# Patient Record
Sex: FEMALE | Race: WHITE | NOT HISPANIC OR LATINO | Employment: OTHER | URBAN - METROPOLITAN AREA
[De-identification: names, ages, dates, MRNs, and addresses within clinical notes are randomized per-mention and may not be internally consistent; named-entity substitution may affect disease eponyms.]

---

## 2023-09-07 LAB
ALANINE AMINOTRANSFERASE (SGPT) (U/L) IN SER/PLAS: 23 U/L (ref 7–45)
ALBUMIN (G/DL) IN SER/PLAS: 4.5 G/DL (ref 3.4–5)
ALKALINE PHOSPHATASE (U/L) IN SER/PLAS: 67 U/L (ref 33–136)
ANION GAP IN SER/PLAS: 17 MMOL/L (ref 10–20)
ASPARTATE AMINOTRANSFERASE (SGOT) (U/L) IN SER/PLAS: 24 U/L (ref 9–39)
BASOPHILS (10*3/UL) IN BLOOD BY AUTOMATED COUNT: 0.08 X10E9/L (ref 0–0.1)
BASOPHILS/100 LEUKOCYTES IN BLOOD BY AUTOMATED COUNT: 1 % (ref 0–2)
BILIRUBIN TOTAL (MG/DL) IN SER/PLAS: 0.7 MG/DL (ref 0–1.2)
CALCIDIOL (25 OH VITAMIN D3) (NG/ML) IN SER/PLAS: 33 NG/ML
CALCIUM (MG/DL) IN SER/PLAS: 10.5 MG/DL (ref 8.6–10.6)
CARBON DIOXIDE, TOTAL (MMOL/L) IN SER/PLAS: 27 MMOL/L (ref 21–32)
CHLORIDE (MMOL/L) IN SER/PLAS: 99 MMOL/L (ref 98–107)
CHOLESTEROL (MG/DL) IN SER/PLAS: 199 MG/DL (ref 0–199)
CHOLESTEROL IN HDL (MG/DL) IN SER/PLAS: 49 MG/DL
CHOLESTEROL/HDL RATIO: 4.1
CREATININE (MG/DL) IN SER/PLAS: 0.94 MG/DL (ref 0.5–1.05)
EOSINOPHILS (10*3/UL) IN BLOOD BY AUTOMATED COUNT: 0.29 X10E9/L (ref 0–0.4)
EOSINOPHILS/100 LEUKOCYTES IN BLOOD BY AUTOMATED COUNT: 3.5 % (ref 0–6)
ERYTHROCYTE DISTRIBUTION WIDTH (RATIO) BY AUTOMATED COUNT: 13.8 % (ref 11.5–14.5)
ERYTHROCYTE MEAN CORPUSCULAR HEMOGLOBIN CONCENTRATION (G/DL) BY AUTOMATED: 31.6 G/DL (ref 32–36)
ERYTHROCYTE MEAN CORPUSCULAR VOLUME (FL) BY AUTOMATED COUNT: 95 FL (ref 80–100)
ERYTHROCYTES (10*6/UL) IN BLOOD BY AUTOMATED COUNT: 4.88 X10E12/L (ref 4–5.2)
ESTIMATED AVERAGE GLUCOSE FOR HBA1C: 117 MG/DL
GFR FEMALE: 64 ML/MIN/1.73M2
GLUCOSE (MG/DL) IN SER/PLAS: 103 MG/DL (ref 74–99)
HEMATOCRIT (%) IN BLOOD BY AUTOMATED COUNT: 46.2 % (ref 36–46)
HEMOGLOBIN (G/DL) IN BLOOD: 14.6 G/DL (ref 12–16)
HEMOGLOBIN A1C/HEMOGLOBIN TOTAL IN BLOOD: 5.7 %
IMMATURE GRANULOCYTES/100 LEUKOCYTES IN BLOOD BY AUTOMATED COUNT: 0.2 % (ref 0–0.9)
LDL: 120 MG/DL (ref 0–99)
LEUKOCYTES (10*3/UL) IN BLOOD BY AUTOMATED COUNT: 8.3 X10E9/L (ref 4.4–11.3)
LYMPHOCYTES (10*3/UL) IN BLOOD BY AUTOMATED COUNT: 2.01 X10E9/L (ref 0.8–3)
LYMPHOCYTES/100 LEUKOCYTES IN BLOOD BY AUTOMATED COUNT: 24.2 % (ref 13–44)
MONOCYTES (10*3/UL) IN BLOOD BY AUTOMATED COUNT: 0.55 X10E9/L (ref 0.05–0.8)
MONOCYTES/100 LEUKOCYTES IN BLOOD BY AUTOMATED COUNT: 6.6 % (ref 2–10)
NEUTROPHILS (10*3/UL) IN BLOOD BY AUTOMATED COUNT: 5.37 X10E9/L (ref 1.6–5.5)
NEUTROPHILS/100 LEUKOCYTES IN BLOOD BY AUTOMATED COUNT: 64.5 % (ref 40–80)
NRBC (PER 100 WBCS) BY AUTOMATED COUNT: 0 /100 WBC (ref 0–0)
PLATELETS (10*3/UL) IN BLOOD AUTOMATED COUNT: 300 X10E9/L (ref 150–450)
POTASSIUM (MMOL/L) IN SER/PLAS: 4 MMOL/L (ref 3.5–5.3)
PROTEIN TOTAL: 7.9 G/DL (ref 6.4–8.2)
SODIUM (MMOL/L) IN SER/PLAS: 139 MMOL/L (ref 136–145)
THYROTROPIN (MIU/L) IN SER/PLAS BY DETECTION LIMIT <= 0.05 MIU/L: 2.5 MIU/L (ref 0.44–3.98)
TRIGLYCERIDE (MG/DL) IN SER/PLAS: 151 MG/DL (ref 0–149)
UREA NITROGEN (MG/DL) IN SER/PLAS: 17 MG/DL (ref 6–23)
VLDL: 30 MG/DL (ref 0–40)

## 2023-10-04 DIAGNOSIS — E78.2 MIXED HYPERLIPIDEMIA: Primary | ICD-10-CM

## 2023-10-04 RX ORDER — PRAVASTATIN SODIUM 20 MG/1
20 TABLET ORAL NIGHTLY
Qty: 90 TABLET | Refills: 3 | Status: SHIPPED | OUTPATIENT
Start: 2023-10-04 | End: 2023-10-05 | Stop reason: SDUPTHER

## 2023-10-04 RX ORDER — PRAVASTATIN SODIUM 20 MG/1
20 TABLET ORAL NIGHTLY
COMMUNITY
End: 2023-10-04 | Stop reason: SDUPTHER

## 2023-10-05 DIAGNOSIS — E78.2 MIXED HYPERLIPIDEMIA: ICD-10-CM

## 2023-10-06 RX ORDER — PRAVASTATIN SODIUM 20 MG/1
20 TABLET ORAL NIGHTLY
Qty: 90 TABLET | Refills: 3 | OUTPATIENT
Start: 2023-10-06

## 2023-10-06 RX ORDER — PRAVASTATIN SODIUM 20 MG/1
20 TABLET ORAL NIGHTLY
Qty: 90 TABLET | Refills: 3 | Status: SHIPPED | OUTPATIENT
Start: 2023-10-06

## 2023-10-24 ENCOUNTER — HOSPITAL ENCOUNTER (OUTPATIENT)
Dept: CARDIOLOGY | Facility: CLINIC | Age: 73
Discharge: HOME | End: 2023-10-24
Payer: MEDICARE

## 2023-10-24 DIAGNOSIS — I49.5 SICK SINUS SYNDROME (MULTI): ICD-10-CM

## 2023-10-24 DIAGNOSIS — E03.9 HYPOTHYROIDISM, UNSPECIFIED TYPE: Primary | ICD-10-CM

## 2023-10-24 PROCEDURE — 93296 REM INTERROG EVL PM/IDS: CPT

## 2023-10-24 PROCEDURE — 93294 REM INTERROG EVL PM/LDLS PM: CPT | Performed by: INTERNAL MEDICINE

## 2023-10-24 RX ORDER — LEVOTHYROXINE SODIUM 112 UG/1
112 TABLET ORAL DAILY
Qty: 90 TABLET | Refills: 3 | Status: SHIPPED | OUTPATIENT
Start: 2023-10-24

## 2023-10-25 DIAGNOSIS — I49.5 SICK SINUS SYNDROME (MULTI): ICD-10-CM

## 2023-11-02 ENCOUNTER — HOSPITAL ENCOUNTER (OUTPATIENT)
Dept: CARDIOLOGY | Facility: CLINIC | Age: 73
Discharge: HOME | End: 2023-11-02
Payer: MEDICARE

## 2023-11-02 DIAGNOSIS — I49.5 SICK SINUS SYNDROME (MULTI): ICD-10-CM

## 2023-11-02 DIAGNOSIS — Z95.0 CARDIAC PACEMAKER IN SITU: ICD-10-CM

## 2023-11-17 DIAGNOSIS — I48.91 ATRIAL FIBRILLATION, UNSPECIFIED TYPE (MULTI): ICD-10-CM

## 2023-11-17 DIAGNOSIS — I10 HYPERTENSION, UNSPECIFIED TYPE: ICD-10-CM

## 2023-11-17 RX ORDER — ATENOLOL 25 MG/1
25 TABLET ORAL DAILY
COMMUNITY
Start: 2023-10-16 | End: 2023-11-17 | Stop reason: SDUPTHER

## 2023-11-17 RX ORDER — APIXABAN 5 MG/1
5 TABLET, FILM COATED ORAL 2 TIMES DAILY
Qty: 30 TABLET | Refills: 11 | Status: SHIPPED | OUTPATIENT
Start: 2023-11-17 | End: 2024-04-05 | Stop reason: SDUPTHER

## 2023-11-17 RX ORDER — APIXABAN 5 MG/1
5 TABLET, FILM COATED ORAL 2 TIMES DAILY
COMMUNITY
End: 2023-11-17 | Stop reason: SDUPTHER

## 2023-11-17 RX ORDER — ATENOLOL 25 MG/1
25 TABLET ORAL DAILY
Qty: 90 TABLET | Refills: 3 | Status: SHIPPED | OUTPATIENT
Start: 2023-11-17

## 2023-11-27 ENCOUNTER — HOSPITAL ENCOUNTER (OUTPATIENT)
Dept: CARDIOLOGY | Facility: CLINIC | Age: 73
Discharge: HOME | End: 2023-11-27
Payer: MEDICARE

## 2023-11-27 DIAGNOSIS — I49.5 SICK SINUS SYNDROME (MULTI): ICD-10-CM

## 2023-11-27 DIAGNOSIS — Z95.0 CARDIAC PACEMAKER IN SITU: ICD-10-CM

## 2023-11-30 ENCOUNTER — HOSPITAL ENCOUNTER (OUTPATIENT)
Dept: CARDIOLOGY | Facility: CLINIC | Age: 73
Discharge: HOME | End: 2023-11-30
Payer: MEDICARE

## 2023-11-30 DIAGNOSIS — Z95.0 CARDIAC PACEMAKER IN SITU: ICD-10-CM

## 2023-11-30 DIAGNOSIS — I49.5 SICK SINUS SYNDROME (MULTI): ICD-10-CM

## 2023-12-01 ENCOUNTER — TELEPHONE (OUTPATIENT)
Dept: CARDIOLOGY | Facility: CLINIC | Age: 73
End: 2023-12-01
Payer: MEDICARE

## 2023-12-01 NOTE — TELEPHONE ENCOUNTER
Patient received a text that she was to contact the office for a device check.  Please call her to advise.

## 2023-12-04 DIAGNOSIS — Z95.0 CARDIAC PACEMAKER IN SITU: ICD-10-CM

## 2023-12-04 DIAGNOSIS — I49.5 SICK SINUS SYNDROME (MULTI): Primary | ICD-10-CM

## 2023-12-13 ENCOUNTER — HOSPITAL ENCOUNTER (OUTPATIENT)
Dept: CARDIOLOGY | Facility: CLINIC | Age: 73
Discharge: HOME | End: 2023-12-13
Payer: MEDICARE

## 2023-12-13 DIAGNOSIS — Z95.0 CARDIAC PACEMAKER IN SITU: ICD-10-CM

## 2023-12-13 DIAGNOSIS — I49.5 SICK SINUS SYNDROME (MULTI): ICD-10-CM

## 2024-01-02 ENCOUNTER — HOSPITAL ENCOUNTER (OUTPATIENT)
Dept: CARDIOLOGY | Facility: CLINIC | Age: 74
Discharge: HOME | End: 2024-01-02
Payer: MEDICARE

## 2024-01-02 DIAGNOSIS — Z95.0 CARDIAC PACEMAKER IN SITU: ICD-10-CM

## 2024-01-02 DIAGNOSIS — I49.5 SICK SINUS SYNDROME (MULTI): ICD-10-CM

## 2024-01-08 ENCOUNTER — HOSPITAL ENCOUNTER (OUTPATIENT)
Dept: CARDIOLOGY | Facility: CLINIC | Age: 74
Discharge: HOME | End: 2024-01-08
Payer: MEDICARE

## 2024-01-08 DIAGNOSIS — Z95.0 CARDIAC PACEMAKER IN SITU: ICD-10-CM

## 2024-01-08 DIAGNOSIS — I49.5 SICK SINUS SYNDROME (MULTI): ICD-10-CM

## 2024-01-10 ENCOUNTER — APPOINTMENT (OUTPATIENT)
Dept: CARDIOLOGY | Facility: CLINIC | Age: 74
End: 2024-01-10
Payer: MEDICARE

## 2024-01-18 ENCOUNTER — HOSPITAL ENCOUNTER (OUTPATIENT)
Dept: CARDIOLOGY | Facility: CLINIC | Age: 74
Discharge: HOME | End: 2024-01-18
Payer: MEDICARE

## 2024-01-18 ENCOUNTER — TELEPHONE (OUTPATIENT)
Dept: CARDIOLOGY | Facility: CLINIC | Age: 74
End: 2024-01-18
Payer: MEDICARE

## 2024-01-18 DIAGNOSIS — I49.5 SINOATRIAL NODE DYSFUNCTION (MULTI): ICD-10-CM

## 2024-01-18 PROCEDURE — 93288 INTERROG EVL PM/LDLS PM IP: CPT | Performed by: INTERNAL MEDICINE

## 2024-01-18 PROCEDURE — 93288 INTERROG EVL PM/LDLS PM IP: CPT

## 2024-01-18 NOTE — TELEPHONE ENCOUNTER
Patient was in the office having a device check and stopped at the desk inquiring about a handicap placard. Please call patient, thank you

## 2024-01-22 DIAGNOSIS — Z95.0 CARDIAC PACEMAKER IN SITU: ICD-10-CM

## 2024-01-22 DIAGNOSIS — I50.9 CONGESTIVE HEART FAILURE, UNSPECIFIED HF CHRONICITY, UNSPECIFIED HEART FAILURE TYPE (MULTI): ICD-10-CM

## 2024-01-22 DIAGNOSIS — I48.0 PAROXYSMAL ATRIAL FIBRILLATION (MULTI): ICD-10-CM

## 2024-01-22 DIAGNOSIS — I49.5 SICK SINUS SYNDROME (MULTI): ICD-10-CM

## 2024-01-24 ENCOUNTER — HOSPITAL ENCOUNTER (OUTPATIENT)
Dept: CARDIOLOGY | Facility: CLINIC | Age: 74
Discharge: HOME | End: 2024-01-24
Payer: MEDICARE

## 2024-01-24 DIAGNOSIS — I49.5 SICK SINUS SYNDROME (MULTI): ICD-10-CM

## 2024-01-24 DIAGNOSIS — Z95.0 CARDIAC PACEMAKER IN SITU: ICD-10-CM

## 2024-01-26 ENCOUNTER — HOSPITAL ENCOUNTER (OUTPATIENT)
Dept: RADIOLOGY | Facility: HOSPITAL | Age: 74
Discharge: HOME | End: 2024-01-26
Payer: MEDICARE

## 2024-01-26 ENCOUNTER — APPOINTMENT (OUTPATIENT)
Dept: SURGICAL ONCOLOGY | Facility: HOSPITAL | Age: 74
End: 2024-01-26
Payer: MEDICARE

## 2024-01-26 DIAGNOSIS — Z12.31 ENCOUNTER FOR SCREENING MAMMOGRAM FOR MALIGNANT NEOPLASM OF BREAST: ICD-10-CM

## 2024-01-26 PROCEDURE — 77067 SCR MAMMO BI INCL CAD: CPT

## 2024-01-26 PROCEDURE — 77067 SCR MAMMO BI INCL CAD: CPT | Mod: BILATERAL PROCEDURE | Performed by: RADIOLOGY

## 2024-01-26 PROCEDURE — 77063 BREAST TOMOSYNTHESIS BI: CPT | Mod: BILATERAL PROCEDURE | Performed by: RADIOLOGY

## 2024-01-31 DIAGNOSIS — F32.A DEPRESSIVE DISORDER: Primary | ICD-10-CM

## 2024-01-31 RX ORDER — SERTRALINE HYDROCHLORIDE 100 MG/1
100 TABLET, FILM COATED ORAL DAILY
Qty: 90 TABLET | Refills: 3 | Status: SHIPPED | OUTPATIENT
Start: 2024-01-31

## 2024-02-13 ENCOUNTER — HOSPITAL ENCOUNTER (OUTPATIENT)
Dept: CARDIOLOGY | Facility: CLINIC | Age: 74
Discharge: HOME | End: 2024-02-13
Payer: MEDICARE

## 2024-02-13 DIAGNOSIS — I49.5 SICK SINUS SYNDROME (MULTI): ICD-10-CM

## 2024-02-13 DIAGNOSIS — Z95.0 CARDIAC PACEMAKER IN SITU: ICD-10-CM

## 2024-03-12 ENCOUNTER — HOSPITAL ENCOUNTER (OUTPATIENT)
Dept: CARDIOLOGY | Facility: CLINIC | Age: 74
Discharge: HOME | End: 2024-03-12
Payer: MEDICARE

## 2024-03-12 DIAGNOSIS — Z95.0 CARDIAC PACEMAKER IN SITU: ICD-10-CM

## 2024-03-12 DIAGNOSIS — I49.5 SICK SINUS SYNDROME (MULTI): ICD-10-CM

## 2024-03-19 ENCOUNTER — HOSPITAL ENCOUNTER (OUTPATIENT)
Dept: CARDIOLOGY | Facility: CLINIC | Age: 74
Discharge: HOME | End: 2024-03-19
Payer: MEDICARE

## 2024-03-19 DIAGNOSIS — I49.5 SICK SINUS SYNDROME (MULTI): ICD-10-CM

## 2024-03-19 DIAGNOSIS — Z95.0 CARDIAC PACEMAKER IN SITU: ICD-10-CM

## 2024-03-25 ENCOUNTER — HOSPITAL ENCOUNTER (OUTPATIENT)
Dept: CARDIOLOGY | Facility: CLINIC | Age: 74
Discharge: HOME | End: 2024-03-25
Payer: MEDICARE

## 2024-04-02 ENCOUNTER — HOSPITAL ENCOUNTER (OUTPATIENT)
Dept: CARDIOLOGY | Facility: CLINIC | Age: 74
Discharge: HOME | End: 2024-04-02
Payer: MEDICARE

## 2024-04-02 DIAGNOSIS — I49.5 SICK SINUS SYNDROME (MULTI): ICD-10-CM

## 2024-04-02 DIAGNOSIS — Z95.0 CARDIAC PACEMAKER IN SITU: ICD-10-CM

## 2024-04-05 DIAGNOSIS — I48.91 ATRIAL FIBRILLATION, UNSPECIFIED TYPE (MULTI): ICD-10-CM

## 2024-04-06 RX ORDER — APIXABAN 5 MG/1
5 TABLET, FILM COATED ORAL 2 TIMES DAILY
Qty: 180 TABLET | Refills: 3 | Status: SHIPPED | OUTPATIENT
Start: 2024-04-06 | End: 2025-04-06

## 2024-04-30 ENCOUNTER — HOSPITAL ENCOUNTER (OUTPATIENT)
Dept: CARDIOLOGY | Facility: CLINIC | Age: 74
Discharge: HOME | End: 2024-04-30
Payer: MEDICARE

## 2024-04-30 DIAGNOSIS — Z95.0 CARDIAC PACEMAKER IN SITU: ICD-10-CM

## 2024-04-30 DIAGNOSIS — I49.5 SICK SINUS SYNDROME (MULTI): ICD-10-CM

## 2024-04-30 PROCEDURE — 93296 REM INTERROG EVL PM/IDS: CPT

## 2024-04-30 PROCEDURE — 93294 REM INTERROG EVL PM/LDLS PM: CPT | Performed by: INTERNAL MEDICINE

## 2024-05-14 ENCOUNTER — HOSPITAL ENCOUNTER (OUTPATIENT)
Dept: CARDIOLOGY | Facility: CLINIC | Age: 74
Discharge: HOME | End: 2024-05-14
Payer: MEDICARE

## 2024-05-14 DIAGNOSIS — Z95.0 CARDIAC PACEMAKER IN SITU: ICD-10-CM

## 2024-05-14 DIAGNOSIS — I49.5 SICK SINUS SYNDROME (MULTI): ICD-10-CM

## 2024-06-10 ENCOUNTER — HOSPITAL ENCOUNTER (OUTPATIENT)
Dept: CARDIOLOGY | Facility: CLINIC | Age: 74
Discharge: HOME | End: 2024-06-10
Payer: MEDICARE

## 2024-06-10 DIAGNOSIS — I49.5 SICK SINUS SYNDROME (MULTI): ICD-10-CM

## 2024-06-10 DIAGNOSIS — Z95.0 CARDIAC PACEMAKER IN SITU: ICD-10-CM

## 2024-06-17 DIAGNOSIS — I10 BENIGN ESSENTIAL HYPERTENSION: Primary | ICD-10-CM

## 2024-06-17 RX ORDER — HYDROCHLOROTHIAZIDE 25 MG/1
25 TABLET ORAL DAILY
Qty: 90 TABLET | Refills: 3 | Status: SHIPPED | OUTPATIENT
Start: 2024-06-17

## 2024-06-19 ENCOUNTER — HOSPITAL ENCOUNTER (OUTPATIENT)
Dept: CARDIOLOGY | Facility: CLINIC | Age: 74
Discharge: HOME | End: 2024-06-19
Payer: MEDICARE

## 2024-06-19 DIAGNOSIS — I49.5 SICK SINUS SYNDROME (MULTI): ICD-10-CM

## 2024-06-19 DIAGNOSIS — Z95.0 CARDIAC PACEMAKER IN SITU: ICD-10-CM

## 2024-07-08 ENCOUNTER — HOSPITAL ENCOUNTER (OUTPATIENT)
Dept: CARDIOLOGY | Facility: CLINIC | Age: 74
Discharge: HOME | End: 2024-07-08
Payer: MEDICARE

## 2024-07-08 DIAGNOSIS — Z95.0 CARDIAC PACEMAKER IN SITU: ICD-10-CM

## 2024-07-08 DIAGNOSIS — I49.5 SICK SINUS SYNDROME (MULTI): ICD-10-CM

## 2024-07-18 ENCOUNTER — APPOINTMENT (OUTPATIENT)
Dept: CARDIOLOGY | Facility: CLINIC | Age: 74
End: 2024-07-18
Payer: MEDICARE

## 2024-07-29 PROBLEM — R00.2 PALPITATIONS: Status: ACTIVE | Noted: 2024-07-29

## 2024-07-29 PROBLEM — E03.9 HYPOTHYROIDISM: Status: ACTIVE | Noted: 2024-07-29

## 2024-07-29 PROBLEM — R42 DIZZINESS: Status: ACTIVE | Noted: 2024-07-29

## 2024-07-29 PROBLEM — Z95.0 PRESENCE OF CARDIAC PACEMAKER: Status: ACTIVE | Noted: 2024-07-29

## 2024-07-29 PROBLEM — R53.83 FATIGUE: Status: ACTIVE | Noted: 2024-07-29

## 2024-07-29 PROBLEM — M17.0 OSTEOARTHRITIS OF BOTH KNEES: Status: ACTIVE | Noted: 2024-07-29

## 2024-07-29 PROBLEM — Z85.3 HISTORY OF BREAST CANCER: Status: ACTIVE | Noted: 2024-07-29

## 2024-07-29 PROBLEM — J32.9 SINUSITIS: Status: ACTIVE | Noted: 2024-07-29

## 2024-07-29 PROBLEM — E55.9 VITAMIN D DEFICIENCY: Status: ACTIVE | Noted: 2024-07-29

## 2024-07-29 PROBLEM — B02.9 HERPES ZOSTER: Status: ACTIVE | Noted: 2024-07-29

## 2024-07-29 PROBLEM — N63.10 LUMP OF BREAST, RIGHT: Status: ACTIVE | Noted: 2024-07-29

## 2024-07-29 PROBLEM — H61.20 IMPACTED CERUMEN: Status: ACTIVE | Noted: 2024-07-29

## 2024-07-29 PROBLEM — E78.2 MIXED HYPERLIPIDEMIA: Status: ACTIVE | Noted: 2024-07-29

## 2024-07-29 PROBLEM — R92.8 ABNORMAL MAMMOGRAM: Status: ACTIVE | Noted: 2024-07-29

## 2024-07-29 PROBLEM — R73.01 IMPAIRED FASTING GLUCOSE: Status: ACTIVE | Noted: 2024-07-29

## 2024-07-29 PROBLEM — R42 LIGHTHEADEDNESS: Status: ACTIVE | Noted: 2024-07-29

## 2024-07-29 PROBLEM — J18.9 COMMUNITY ACQUIRED PNEUMONIA: Status: ACTIVE | Noted: 2024-07-29

## 2024-07-29 PROBLEM — E66.9 OBESITY: Status: ACTIVE | Noted: 2024-07-29

## 2024-07-29 PROBLEM — I48.0 PAROXYSMAL ATRIAL FIBRILLATION (MULTI): Status: ACTIVE | Noted: 2024-07-29

## 2024-07-29 PROBLEM — I10 BENIGN ESSENTIAL HYPERTENSION: Status: ACTIVE | Noted: 2024-07-29

## 2024-08-14 ENCOUNTER — HOSPITAL ENCOUNTER (OUTPATIENT)
Dept: CARDIOLOGY | Facility: CLINIC | Age: 74
Discharge: HOME | End: 2024-08-14
Payer: MEDICARE

## 2024-08-14 DIAGNOSIS — I49.5 SICK SINUS SYNDROME (MULTI): ICD-10-CM

## 2024-08-14 DIAGNOSIS — Z95.0 CARDIAC PACEMAKER IN SITU: ICD-10-CM

## 2024-08-14 PROCEDURE — 93294 REM INTERROG EVL PM/LDLS PM: CPT | Performed by: INTERNAL MEDICINE

## 2024-08-14 PROCEDURE — 93296 REM INTERROG EVL PM/IDS: CPT

## 2024-08-15 DIAGNOSIS — E78.2 MIXED HYPERLIPIDEMIA: ICD-10-CM

## 2024-08-15 DIAGNOSIS — E03.9 HYPOTHYROIDISM, UNSPECIFIED TYPE: ICD-10-CM

## 2024-08-16 ENCOUNTER — OFFICE VISIT (OUTPATIENT)
Dept: CARDIOLOGY | Facility: CLINIC | Age: 74
End: 2024-08-16
Payer: MEDICARE

## 2024-08-16 VITALS
BODY MASS INDEX: 31.08 KG/M2 | OXYGEN SATURATION: 98 % | DIASTOLIC BLOOD PRESSURE: 69 MMHG | WEIGHT: 198 LBS | SYSTOLIC BLOOD PRESSURE: 111 MMHG | HEART RATE: 59 BPM | HEIGHT: 67 IN

## 2024-08-16 DIAGNOSIS — Z95.0 PRESENCE OF CARDIAC PACEMAKER: ICD-10-CM

## 2024-08-16 DIAGNOSIS — I10 HYPERTENSION, UNSPECIFIED TYPE: ICD-10-CM

## 2024-08-16 DIAGNOSIS — I48.0 PAROXYSMAL ATRIAL FIBRILLATION (MULTI): Primary | ICD-10-CM

## 2024-08-16 PROCEDURE — 1159F MED LIST DOCD IN RCRD: CPT | Performed by: INTERNAL MEDICINE

## 2024-08-16 PROCEDURE — 1036F TOBACCO NON-USER: CPT | Performed by: INTERNAL MEDICINE

## 2024-08-16 PROCEDURE — 3078F DIAST BP <80 MM HG: CPT | Performed by: INTERNAL MEDICINE

## 2024-08-16 PROCEDURE — 99214 OFFICE O/P EST MOD 30 MIN: CPT | Performed by: INTERNAL MEDICINE

## 2024-08-16 PROCEDURE — 3008F BODY MASS INDEX DOCD: CPT | Performed by: INTERNAL MEDICINE

## 2024-08-16 PROCEDURE — 1126F AMNT PAIN NOTED NONE PRSNT: CPT | Performed by: INTERNAL MEDICINE

## 2024-08-16 PROCEDURE — 3074F SYST BP LT 130 MM HG: CPT | Performed by: INTERNAL MEDICINE

## 2024-08-16 RX ORDER — ATENOLOL 25 MG/1
25 TABLET ORAL 2 TIMES DAILY
Qty: 180 TABLET | Refills: 3 | Status: SHIPPED | OUTPATIENT
Start: 2024-08-16 | End: 2025-08-16

## 2024-08-16 RX ORDER — PRAVASTATIN SODIUM 20 MG/1
20 TABLET ORAL NIGHTLY
Qty: 90 TABLET | Refills: 3 | Status: SHIPPED | OUTPATIENT
Start: 2024-08-16

## 2024-08-16 RX ORDER — LEVOTHYROXINE SODIUM 112 UG/1
112 TABLET ORAL DAILY
Qty: 90 TABLET | Refills: 3 | Status: SHIPPED | OUTPATIENT
Start: 2024-08-16

## 2024-08-16 ASSESSMENT — PAIN SCALES - GENERAL: PAINLEVEL: 0-NO PAIN

## 2024-08-16 ASSESSMENT — ENCOUNTER SYMPTOMS
DEPRESSION: 0
OCCASIONAL FEELINGS OF UNSTEADINESS: 0
LOSS OF SENSATION IN FEET: 0

## 2024-08-16 NOTE — PROGRESS NOTES
"History Of Present Illness:      This is a 74-year-old female with atrial fibrillation and sinus node dysfunction.  She occasionally has palpitations and some dyspnea on exertion.  She has to take a rest periodically when she feels dyspneic.    The patient was initially seen in consultation May 26, 2021 because of lightheadedness and near syncope.  She was identified to have symptomatic bradycardia and required insertion of a dual-chamber permanent pacemaker.    Past medical history:    Hypertension  Breast cancer: History of left partial mastectomy, right mastectomy, and radiation therapy  Hyperlipidemia  Hypothyroidism  Pacemaker insertion: [employer] MRI [id number] implanted June 29, 2021    Review of Systems  Other review of systems negative     Last Recorded Vitals:      9/30/2021     8:00 AM 1/24/2022     9:42 AM 6/29/2022     7:29 AM 3/29/2023     1:58 PM 5/19/2023     7:36 AM 9/27/2023    10:59 AM 8/16/2024    10:57 AM   Vitals   Systolic 127 155 130 128 128 104 111   Diastolic 72 82 82 84 70 68 69   Heart Rate 82 88 71 102 87 59 59   Temp 35.8 °C (96.4 °F)  35.9 °C (96.6 °F)  36 °C (96.8 °F)     Height (in)  1.676 m (5' 6\") 1.676 m (5' 6\") 1.676 m (5' 6\")  1.676 m (5' 6\") 1.702 m (5' 7\")   Weight (lb) 194.56 196 203.31 203 203.25 203 198   BMI 31.4 kg/m2 31.64 kg/m2 32.82 kg/m2 32.77 kg/m2 32.81 kg/m2 32.77 kg/m2 31.01 kg/m2   BSA (m2) 2.03 m2 2.03 m2 2.07 m2 2.07 m2 2.07 m2 2.07 m2 2.06 m2   Visit Report       Report     Allergies:  Codeine    Outpatient Medications:  Current Outpatient Medications   Medication Instructions    atenolol (TENORMIN) 25 mg, oral, 2 times daily    Eliquis 5 mg, oral, 2 times daily    hydroCHLOROthiazide (HYDRODIURIL) 25 mg, oral, Daily    levothyroxine (SYNTHROID, LEVOXYL) 112 mcg, oral, Daily    pravastatin (PRAVACHOL) 20 mg, oral, Nightly    sertraline (ZOLOFT) 100 mg, oral, Daily     Physical Exam:    General Appearance:  Alert, oriented, no distress  Skin:  Warm and " dry  Head and Neck:  No elevation of JVP, no carotid bruits  Cardiac Exam:  Rhythm is regular, S1 and S2 are normal, no murmur S3 or S4  Lungs:  Clear to auscultation  Extremities:  no edema  Neurologic:  No focal deficits  Psychiatric:  Appropriate mood and behavior    Cardiology Tests:  I have personally review the diagnostic cardiac testing and my interpretation is as follows:    Echocardiogram June 2021: Ejection fraction 55%      Assessment/Plan   Problem List Items Addressed This Visit             ICD-10-CM    Presence of cardiac pacemaker (Chronic) Z95.0     Sick sinus syndrome: History of an [employer] pacemaker insertion for symptomatic bradycardia.  The device was implanted June 29, 2021 and is functioning appropriately with stable battery status.  Follow-up through pacemaker clinic as scheduled         Paroxysmal atrial fibrillation (Multi) - Primary (Chronic) I48.0     Paroxysmal atrial fibrillation: Alisha is in sinus rhythm today but she is having episodes of atrial fibrillation detected by the pacemaker.  Overall the AF burden has been low, but she is symptomatic at times.  I have recommended that the atenolol be increased to 25 mg twice daily.  If she is still symptomatic, we can consider the addition of an antiarrhythmic drug for AF suppression.  I also discussed the possibility of a pulmonary vein isolation procedure for more definitive rhythm control therapy if the AF burden increases.  Continue Eliquis for anticoagulation.         Relevant Medications    atenolol (Tenormin) 25 mg tablet     Other Visit Diagnoses         Codes    Hypertension, unspecified type     I10    Relevant Medications    atenolol (Tenormin) 25 mg tablet          James C Ramicone, DO

## 2024-08-16 NOTE — ASSESSMENT & PLAN NOTE
Paroxysmal atrial fibrillation: Alisha is in sinus rhythm today but she is having episodes of atrial fibrillation detected by the pacemaker.  Overall the AF burden has been low, but she is symptomatic at times.  I have recommended that the atenolol be increased to 25 mg twice daily.  If she is still symptomatic, we can consider the addition of an antiarrhythmic drug for AF suppression.  I also discussed the possibility of a pulmonary vein isolation procedure for more definitive rhythm control therapy if the AF burden increases.  Continue Eliquis for anticoagulation.

## 2024-08-16 NOTE — ASSESSMENT & PLAN NOTE
Sick sinus syndrome: History of an [employer] pacemaker insertion for symptomatic bradycardia.  The device was implanted June 29, 2021 and is functioning appropriately with stable battery status.  Follow-up through pacemaker clinic as scheduled

## 2024-09-05 ENCOUNTER — APPOINTMENT (OUTPATIENT)
Dept: PRIMARY CARE | Facility: CLINIC | Age: 74
End: 2024-09-05
Payer: MEDICARE

## 2024-09-05 VITALS
SYSTOLIC BLOOD PRESSURE: 126 MMHG | OXYGEN SATURATION: 96 % | DIASTOLIC BLOOD PRESSURE: 78 MMHG | WEIGHT: 201.3 LBS | HEART RATE: 60 BPM | HEIGHT: 67 IN | BODY MASS INDEX: 31.6 KG/M2 | TEMPERATURE: 97.2 F

## 2024-09-05 DIAGNOSIS — H61.21 IMPACTED CERUMEN OF RIGHT EAR: ICD-10-CM

## 2024-09-05 DIAGNOSIS — E55.9 VITAMIN D DEFICIENCY: ICD-10-CM

## 2024-09-05 DIAGNOSIS — Z00.00 MEDICARE ANNUAL WELLNESS VISIT, SUBSEQUENT: Primary | ICD-10-CM

## 2024-09-05 DIAGNOSIS — R05.8 DRY COUGH: ICD-10-CM

## 2024-09-05 DIAGNOSIS — F32.A DEPRESSIVE DISORDER: ICD-10-CM

## 2024-09-05 DIAGNOSIS — I10 BENIGN ESSENTIAL HYPERTENSION: ICD-10-CM

## 2024-09-05 DIAGNOSIS — R06.09 DYSPNEA ON EXERTION: ICD-10-CM

## 2024-09-05 DIAGNOSIS — R73.01 IMPAIRED FASTING GLUCOSE: ICD-10-CM

## 2024-09-05 DIAGNOSIS — Z12.31 VISIT FOR SCREENING MAMMOGRAM: ICD-10-CM

## 2024-09-05 DIAGNOSIS — E78.2 MIXED HYPERLIPIDEMIA: ICD-10-CM

## 2024-09-05 DIAGNOSIS — E03.9 ACQUIRED HYPOTHYROIDISM: ICD-10-CM

## 2024-09-05 PROBLEM — J32.9 SINUSITIS: Status: RESOLVED | Noted: 2024-07-29 | Resolved: 2024-09-05

## 2024-09-05 PROBLEM — Z71.89 CARDIAC RISK COUNSELING: Status: ACTIVE | Noted: 2024-09-05

## 2024-09-05 PROBLEM — B02.9 HERPES ZOSTER: Status: RESOLVED | Noted: 2024-07-29 | Resolved: 2024-09-05

## 2024-09-05 PROBLEM — Z13.89 SCREENING FOR MULTIPLE CONDITIONS: Status: ACTIVE | Noted: 2024-09-05

## 2024-09-05 PROCEDURE — 1170F FXNL STATUS ASSESSED: CPT | Performed by: INTERNAL MEDICINE

## 2024-09-05 PROCEDURE — 3008F BODY MASS INDEX DOCD: CPT | Performed by: INTERNAL MEDICINE

## 2024-09-05 PROCEDURE — 1036F TOBACCO NON-USER: CPT | Performed by: INTERNAL MEDICINE

## 2024-09-05 PROCEDURE — G0439 PPPS, SUBSEQ VISIT: HCPCS | Performed by: INTERNAL MEDICINE

## 2024-09-05 PROCEDURE — 1160F RVW MEDS BY RX/DR IN RCRD: CPT | Performed by: INTERNAL MEDICINE

## 2024-09-05 PROCEDURE — 1159F MED LIST DOCD IN RCRD: CPT | Performed by: INTERNAL MEDICINE

## 2024-09-05 PROCEDURE — 1124F ACP DISCUSS-NO DSCNMKR DOCD: CPT | Performed by: INTERNAL MEDICINE

## 2024-09-05 PROCEDURE — 99214 OFFICE O/P EST MOD 30 MIN: CPT | Performed by: INTERNAL MEDICINE

## 2024-09-05 PROCEDURE — 3078F DIAST BP <80 MM HG: CPT | Performed by: INTERNAL MEDICINE

## 2024-09-05 PROCEDURE — 3074F SYST BP LT 130 MM HG: CPT | Performed by: INTERNAL MEDICINE

## 2024-09-05 ASSESSMENT — ENCOUNTER SYMPTOMS
SHORTNESS OF BREATH: 1
PALPITATIONS: 0
FATIGUE: 0
VOMITING: 0
BACK PAIN: 0
DYSURIA: 0
WHEEZING: 0
NAUSEA: 0
CONSTIPATION: 0
FEVER: 0
DIARRHEA: 0
COUGH: 1
ARTHRALGIAS: 0
CHILLS: 0
LIGHT-HEADEDNESS: 0
ABDOMINAL PAIN: 0
HEADACHES: 0
SORE THROAT: 0
DYSPHORIC MOOD: 0
DIZZINESS: 0
HEMATURIA: 0
CONFUSION: 0

## 2024-09-05 ASSESSMENT — ACTIVITIES OF DAILY LIVING (ADL)
DRESSING: INDEPENDENT
TAKING_MEDICATION: INDEPENDENT
BATHING: INDEPENDENT
GROCERY_SHOPPING: INDEPENDENT
DOING_HOUSEWORK: INDEPENDENT
MANAGING_FINANCES: INDEPENDENT

## 2024-09-05 ASSESSMENT — PATIENT HEALTH QUESTIONNAIRE - PHQ9
SUM OF ALL RESPONSES TO PHQ9 QUESTIONS 1 AND 2: 0
2. FEELING DOWN, DEPRESSED OR HOPELESS: NOT AT ALL
1. LITTLE INTEREST OR PLEASURE IN DOING THINGS: NOT AT ALL

## 2024-09-05 NOTE — PROGRESS NOTES
CHIEF COMPLAINT  Medicare Annual Wellness Visit Subsequent    HISTORY OF PRESENT ILLNESS  Alisha Garcia is a 74 y.o. female presents today for follow up of Medicare Annual Wellness Visit Subsequent    HPI    Past Medical, Surgical, and Family History reviewed and updated in chart.  Reviewed all medications by prescribing practitioner or clinical pharmacist (such as prescriptions, OTCs, herbal therapies and supplements) and documented in the medical record.    Dry cough, easily out of breath.  Has history of radiation for breast cancer.  She's concerned she may have some pulmonary fibrosis.  She usually feels better if she can rest for several minutes.  Reduced stamina.    States she discussed with her cardiologist, who increased her beta blocker, however she had some some research and felt her symptoms were more likely pulmonary.    REVIEW OF SYSTEMS  Review of Systems   Constitutional:  Negative for chills, fatigue and fever.   HENT:  Negative for sore throat.    Respiratory:  Positive for cough and shortness of breath. Negative for wheezing.    Cardiovascular:  Negative for chest pain, palpitations and leg swelling.   Gastrointestinal:  Negative for abdominal pain, constipation, diarrhea, nausea and vomiting.   Genitourinary:  Negative for dysuria and hematuria.   Musculoskeletal:  Negative for arthralgias, back pain and gait problem.   Skin:  Negative for rash.   Neurological:  Negative for dizziness, light-headedness and headaches.   Psychiatric/Behavioral:  Negative for confusion and dysphoric mood.        ALLERGIES  Codeine, Lisinopril, Adhesive tape-silicones, and Ezetimibe    MEDICATIONS  Current Outpatient Medications   Medication Instructions    atenolol (TENORMIN) 25 mg, oral, 2 times daily    Eliquis 5 mg, oral, 2 times daily    hydroCHLOROthiazide (HYDRODIURIL) 25 mg, oral, Daily    levothyroxine (SYNTHROID, LEVOXYL) 112 mcg, oral, Daily    pravastatin (PRAVACHOL) 20 mg, oral, Nightly    sertraline  "(ZOLOFT) 100 mg, oral, Daily       TOBACCO USE  Social History     Tobacco Use   Smoking Status Never   Smokeless Tobacco Never       DEPRESSION SCREEN  Over the past 2 weeks, how often have you been bothered by any of the following problems?  Little interest or pleasure in doing things: Not at all  Feeling down, depressed, or hopeless: Not at all    SURGICAL HISTORY  Past Surgical History:  No date: BREAST LUMPECTOMY; Left  No date: BREAST LUMPECTOMY; Right  04/26/2016: CHOLECYSTECTOMY      Comment:  Cholecystectomy  08/11/2015: COLONOSCOPY      Comment:  Colonoscopy (Fiberoptic)  08/11/2015: OTHER SURGICAL HISTORY      Comment:  Axillary Lymphadenectomy - Superficial  08/11/2015: OTHER SURGICAL HISTORY      Comment:  Breast Surgery Excision Of Breast Single Lesion  08/11/2015: OTHER SURGICAL HISTORY      Comment:  Incision And Removal Of Hematoma       OBJECTIVE    /78   Pulse 60   Temp 36.2 °C (97.2 °F)   Ht 1.702 m (5' 7\")   Wt 91.3 kg (201 lb 4.8 oz)   SpO2 96%   BMI 31.53 kg/m²    BMI: Estimated body mass index is 31.53 kg/m² as calculated from the following:    Height as of this encounter: 1.702 m (5' 7\").    Weight as of this encounter: 91.3 kg (201 lb 4.8 oz).    BP Readings from Last 3 Encounters:   09/05/24 126/78   08/16/24 111/69   09/27/23 104/68      Wt Readings from Last 3 Encounters:   09/05/24 91.3 kg (201 lb 4.8 oz)   08/16/24 89.8 kg (198 lb)   09/27/23 92.1 kg (203 lb)        PHYSICAL EXAM  Physical Exam  Constitutional:       Appearance: Normal appearance.   HENT:      Head: Normocephalic and atraumatic.      Right Ear: There is impacted cerumen.      Left Ear: Tympanic membrane and ear canal normal.   Neck:      Vascular: No carotid bruit.   Cardiovascular:      Rate and Rhythm: Normal rate and regular rhythm.      Pulses: Normal pulses.      Heart sounds: No murmur heard.  Pulmonary:      Effort: Pulmonary effort is normal. No respiratory distress.      Breath sounds: Normal " breath sounds. No wheezing.   Abdominal:      General: There is no distension.      Palpations: Abdomen is soft.      Tenderness: There is no abdominal tenderness.   Musculoskeletal:      Right lower leg: No edema.      Left lower leg: No edema.   Skin:     Findings: No rash.      Comments: Seborrheic keratosis right mid lateral back   Neurological:      General: No focal deficit present.      Mental Status: She is alert and oriented to person, place, and time. Mental status is at baseline.   Psychiatric:         Mood and Affect: Mood normal.         Behavior: Behavior normal.         Thought Content: Thought content normal.         Judgment: Judgment normal.          ASSESSMENT AND PLAN  Assessment/Plan   Problem List Items Addressed This Visit       Depressive disorder    Benign essential hypertension    Relevant Orders    Comprehensive Metabolic Panel    CBC and Auto Differential    Hypothyroidism    Relevant Orders    Thyroid Stimulating Hormone    Impaired fasting glucose    Relevant Orders    Hemoglobin A1c    Impacted cerumen    Mixed hyperlipidemia    Relevant Orders    Lipid Panel    Vitamin D deficiency    Relevant Orders    Vitamin D 25-Hydroxy,Total (for eval of Vitamin D levels)    Medicare annual wellness visit, subsequent - Primary    Dry cough    Relevant Orders    CT chest high resolution     Other Visit Diagnoses       Dyspnea on exertion        Relevant Orders    CT chest high resolution          Medicare Wellness Visit     Hypertension - controlled, continue current medication.     Mixed hyperlipidemia - continue statin.     Impaired fasting glucose - check A1c.     Hypothyroidism - check TSH (thyroid stimulating hormone). Adjust levothyroxine if necessary.      Depression - controlled with sertraline.     Paroxysmal Afib - follows with cardiologist. On Eliquis.    Dyspnea on exertion, dry cough, chronic, with history of radiation for breast cancer - pulmonary fibrosis is a possibility, and she  would benefit from pulmonary rehab if that is the case. Chest CT ordered for evaluation.      Breast cancer screening / history of breast cancer - mammogram 1/26/24.     Pap no longer indicated.     Colonoscopy is due - she is not ready to schedule it yet, but states she will call for referral when she is ready.     Reviewed signs of skin cancer and importance of sun protection.      Continue to stay current with routine dental and eye exams.      Flu shot recommended annually in the Fall. Shingrix is recommended.     Follow-up annually.

## 2024-09-16 ENCOUNTER — HOSPITAL ENCOUNTER (OUTPATIENT)
Dept: CARDIOLOGY | Facility: CLINIC | Age: 74
Discharge: HOME | End: 2024-09-16
Payer: MEDICARE

## 2024-09-16 DIAGNOSIS — I49.5 SICK SINUS SYNDROME (MULTI): ICD-10-CM

## 2024-09-16 DIAGNOSIS — Z95.0 CARDIAC PACEMAKER IN SITU: ICD-10-CM

## 2024-09-23 ENCOUNTER — HOSPITAL ENCOUNTER (OUTPATIENT)
Dept: CARDIOLOGY | Facility: CLINIC | Age: 74
Discharge: HOME | End: 2024-09-23
Payer: MEDICARE

## 2024-09-23 ENCOUNTER — CLINICAL SUPPORT (OUTPATIENT)
Dept: PRIMARY CARE | Facility: CLINIC | Age: 74
End: 2024-09-23
Payer: MEDICARE

## 2024-09-23 DIAGNOSIS — E78.2 MIXED HYPERLIPIDEMIA: ICD-10-CM

## 2024-09-23 DIAGNOSIS — I49.5 SICK SINUS SYNDROME (MULTI): ICD-10-CM

## 2024-09-23 DIAGNOSIS — R73.01 IMPAIRED FASTING GLUCOSE: ICD-10-CM

## 2024-09-23 DIAGNOSIS — I10 BENIGN ESSENTIAL HYPERTENSION: ICD-10-CM

## 2024-09-23 DIAGNOSIS — E03.9 ACQUIRED HYPOTHYROIDISM: ICD-10-CM

## 2024-09-23 DIAGNOSIS — E55.9 VITAMIN D DEFICIENCY: ICD-10-CM

## 2024-09-23 DIAGNOSIS — Z95.0 CARDIAC PACEMAKER IN SITU: ICD-10-CM

## 2024-09-23 LAB
25(OH)D3 SERPL-MCNC: 39 NG/ML (ref 30–100)
ALBUMIN SERPL BCP-MCNC: 4 G/DL (ref 3.4–5)
ALP SERPL-CCNC: 71 U/L (ref 33–136)
ALT SERPL W P-5'-P-CCNC: 25 U/L (ref 7–45)
ANION GAP SERPL CALC-SCNC: 13 MMOL/L (ref 10–20)
AST SERPL W P-5'-P-CCNC: 28 U/L (ref 9–39)
BASOPHILS # BLD AUTO: 0.05 X10*3/UL (ref 0–0.1)
BASOPHILS NFR BLD AUTO: 0.7 %
BILIRUB SERPL-MCNC: 0.6 MG/DL (ref 0–1.2)
BUN SERPL-MCNC: 13 MG/DL (ref 6–23)
CALCIUM SERPL-MCNC: 9.9 MG/DL (ref 8.6–10.6)
CHLORIDE SERPL-SCNC: 100 MMOL/L (ref 98–107)
CHOLEST SERPL-MCNC: 202 MG/DL (ref 0–199)
CHOLESTEROL/HDL RATIO: 4.1
CO2 SERPL-SCNC: 29 MMOL/L (ref 21–32)
CREAT SERPL-MCNC: 0.87 MG/DL (ref 0.5–1.05)
EGFRCR SERPLBLD CKD-EPI 2021: 70 ML/MIN/1.73M*2
EOSINOPHIL # BLD AUTO: 0.21 X10*3/UL (ref 0–0.4)
EOSINOPHIL NFR BLD AUTO: 2.8 %
ERYTHROCYTE [DISTWIDTH] IN BLOOD BY AUTOMATED COUNT: 13.6 % (ref 11.5–14.5)
EST. AVERAGE GLUCOSE BLD GHB EST-MCNC: 126 MG/DL
GLUCOSE SERPL-MCNC: 109 MG/DL (ref 74–99)
HBA1C MFR BLD: 6 %
HCT VFR BLD AUTO: 43.7 % (ref 36–46)
HDLC SERPL-MCNC: 48.8 MG/DL
HGB BLD-MCNC: 14.2 G/DL (ref 12–16)
IMM GRANULOCYTES # BLD AUTO: 0.03 X10*3/UL (ref 0–0.5)
IMM GRANULOCYTES NFR BLD AUTO: 0.4 % (ref 0–0.9)
LDLC SERPL CALC-MCNC: 127 MG/DL
LYMPHOCYTES # BLD AUTO: 1.68 X10*3/UL (ref 0.8–3)
LYMPHOCYTES NFR BLD AUTO: 22.7 %
MCH RBC QN AUTO: 30.5 PG (ref 26–34)
MCHC RBC AUTO-ENTMCNC: 32.5 G/DL (ref 32–36)
MCV RBC AUTO: 94 FL (ref 80–100)
MONOCYTES # BLD AUTO: 0.53 X10*3/UL (ref 0.05–0.8)
MONOCYTES NFR BLD AUTO: 7.2 %
NEUTROPHILS # BLD AUTO: 4.9 X10*3/UL (ref 1.6–5.5)
NEUTROPHILS NFR BLD AUTO: 66.2 %
NON HDL CHOLESTEROL: 153 MG/DL (ref 0–149)
NRBC BLD-RTO: 0 /100 WBCS (ref 0–0)
PLATELET # BLD AUTO: 278 X10*3/UL (ref 150–450)
POTASSIUM SERPL-SCNC: 4 MMOL/L (ref 3.5–5.3)
PROT SERPL-MCNC: 7.4 G/DL (ref 6.4–8.2)
RBC # BLD AUTO: 4.66 X10*6/UL (ref 4–5.2)
SODIUM SERPL-SCNC: 138 MMOL/L (ref 136–145)
TRIGL SERPL-MCNC: 132 MG/DL (ref 0–149)
TSH SERPL-ACNC: 5.58 MIU/L (ref 0.44–3.98)
VLDL: 26 MG/DL (ref 0–40)
WBC # BLD AUTO: 7.4 X10*3/UL (ref 4.4–11.3)

## 2024-09-23 PROCEDURE — 82306 VITAMIN D 25 HYDROXY: CPT

## 2024-09-23 PROCEDURE — 83036 HEMOGLOBIN GLYCOSYLATED A1C: CPT

## 2024-09-23 PROCEDURE — 80061 LIPID PANEL: CPT

## 2024-09-23 PROCEDURE — 80053 COMPREHEN METABOLIC PANEL: CPT

## 2024-09-23 PROCEDURE — 84443 ASSAY THYROID STIM HORMONE: CPT

## 2024-09-23 PROCEDURE — 85025 COMPLETE CBC W/AUTO DIFF WBC: CPT

## 2024-09-25 ENCOUNTER — HOSPITAL ENCOUNTER (OUTPATIENT)
Dept: RADIOLOGY | Facility: CLINIC | Age: 74
Discharge: HOME | End: 2024-09-25
Payer: MEDICARE

## 2024-09-25 DIAGNOSIS — R05.8 DRY COUGH: ICD-10-CM

## 2024-09-25 DIAGNOSIS — R06.09 DYSPNEA ON EXERTION: ICD-10-CM

## 2024-09-25 PROCEDURE — 71250 CT THORAX DX C-: CPT

## 2024-09-25 PROCEDURE — 71250 CT THORAX DX C-: CPT | Performed by: RADIOLOGY

## 2024-09-26 DIAGNOSIS — J84.10 PULMONARY FIBROSIS (MULTI): Primary | ICD-10-CM

## 2024-09-26 DIAGNOSIS — E03.9 HYPOTHYROIDISM, UNSPECIFIED TYPE: ICD-10-CM

## 2024-09-29 RX ORDER — LEVOTHYROXINE SODIUM 125 UG/1
125 TABLET ORAL DAILY
Qty: 60 TABLET | Refills: 0 | Status: SHIPPED | OUTPATIENT
Start: 2024-09-29

## 2024-10-03 ENCOUNTER — HOSPITAL ENCOUNTER (OUTPATIENT)
Dept: CARDIOLOGY | Facility: CLINIC | Age: 74
Discharge: HOME | End: 2024-10-03
Payer: MEDICARE

## 2024-10-03 DIAGNOSIS — Z95.0 CARDIAC PACEMAKER IN SITU: ICD-10-CM

## 2024-10-03 DIAGNOSIS — I49.5 SICK SINUS SYNDROME (MULTI): ICD-10-CM

## 2024-10-18 ENCOUNTER — TELEPHONE (OUTPATIENT)
Dept: PRIMARY CARE | Facility: CLINIC | Age: 74
End: 2024-10-18
Payer: MEDICARE

## 2024-10-18 DIAGNOSIS — E03.9 HYPOTHYROIDISM, UNSPECIFIED TYPE: ICD-10-CM

## 2024-10-18 NOTE — TELEPHONE ENCOUNTER
Patient called stating she is still taking 112 mcg and is asking if there is a way to bridge to the 125 mcg dose or if ok to wait and switch  or just switch now.    Patient [phone]

## 2024-10-23 ENCOUNTER — TELEPHONE (OUTPATIENT)
Dept: PRIMARY CARE | Facility: CLINIC | Age: 74
End: 2024-10-23
Payer: MEDICARE

## 2024-10-23 RX ORDER — LEVOTHYROXINE SODIUM 125 UG/1
125 TABLET ORAL DAILY
Qty: 60 TABLET | Refills: 0 | Status: SHIPPED | OUTPATIENT
Start: 2024-10-23

## 2024-10-23 NOTE — TELEPHONE ENCOUNTER
Patient called stating Dr. Washington had increased her thyroid medication.  Patient has decided she would rather have the new dose Rx-Levothyroxine 125 mcg.    [id number] - [place], [place] - [address] AT [facility] OF [address]  [address]  [facility] [address]  Phone: [phone] Fax: [phone]    Patient [phone]

## 2024-10-28 ENCOUNTER — HOSPITAL ENCOUNTER (OUTPATIENT)
Dept: CARDIOLOGY | Facility: CLINIC | Age: 74
Discharge: HOME | End: 2024-10-28
Payer: MEDICARE

## 2024-10-28 DIAGNOSIS — I49.5 SICK SINUS SYNDROME (MULTI): ICD-10-CM

## 2024-10-28 DIAGNOSIS — Z95.0 CARDIAC PACEMAKER IN SITU: ICD-10-CM

## 2024-10-29 ENCOUNTER — OFFICE VISIT (OUTPATIENT)
Dept: PRIMARY CARE | Facility: CLINIC | Age: 74
End: 2024-10-29
Payer: MEDICARE

## 2024-10-29 VITALS
TEMPERATURE: 97 F | OXYGEN SATURATION: 94 % | HEIGHT: 67 IN | HEART RATE: 68 BPM | SYSTOLIC BLOOD PRESSURE: 109 MMHG | DIASTOLIC BLOOD PRESSURE: 69 MMHG | BODY MASS INDEX: 31.33 KG/M2 | WEIGHT: 199.6 LBS

## 2024-10-29 DIAGNOSIS — J06.9 UPPER RESPIRATORY TRACT INFECTION, UNSPECIFIED TYPE: Primary | ICD-10-CM

## 2024-10-29 DIAGNOSIS — J84.10 PULMONARY FIBROSIS (MULTI): ICD-10-CM

## 2024-10-29 PROBLEM — J18.9 COMMUNITY ACQUIRED PNEUMONIA: Status: RESOLVED | Noted: 2024-07-29 | Resolved: 2024-10-29

## 2024-10-29 PROCEDURE — 3074F SYST BP LT 130 MM HG: CPT | Performed by: INTERNAL MEDICINE

## 2024-10-29 PROCEDURE — 1036F TOBACCO NON-USER: CPT | Performed by: INTERNAL MEDICINE

## 2024-10-29 PROCEDURE — 1159F MED LIST DOCD IN RCRD: CPT | Performed by: INTERNAL MEDICINE

## 2024-10-29 PROCEDURE — 99213 OFFICE O/P EST LOW 20 MIN: CPT | Performed by: INTERNAL MEDICINE

## 2024-10-29 PROCEDURE — 1160F RVW MEDS BY RX/DR IN RCRD: CPT | Performed by: INTERNAL MEDICINE

## 2024-10-29 PROCEDURE — 3008F BODY MASS INDEX DOCD: CPT | Performed by: INTERNAL MEDICINE

## 2024-10-29 PROCEDURE — 3078F DIAST BP <80 MM HG: CPT | Performed by: INTERNAL MEDICINE

## 2024-10-29 RX ORDER — GLUCOSAMINE/CHONDRO SU A 500-400 MG
1 TABLET ORAL 3 TIMES DAILY
COMMUNITY

## 2024-10-29 RX ORDER — AZITHROMYCIN 250 MG/1
TABLET, FILM COATED ORAL
Qty: 6 TABLET | Refills: 0 | Status: SHIPPED | OUTPATIENT
Start: 2024-10-29 | End: 2024-11-03

## 2024-10-29 ASSESSMENT — ENCOUNTER SYMPTOMS
ABDOMINAL PAIN: 0
SHORTNESS OF BREATH: 1
FATIGUE: 1
SINUS PRESSURE: 0
FACIAL SWELLING: 0
FEVER: 0
SINUS PAIN: 0
COUGH: 1
WHEEZING: 0

## 2024-10-29 ASSESSMENT — PATIENT HEALTH QUESTIONNAIRE - PHQ9
2. FEELING DOWN, DEPRESSED OR HOPELESS: NOT AT ALL
1. LITTLE INTEREST OR PLEASURE IN DOING THINGS: NOT AT ALL
SUM OF ALL RESPONSES TO PHQ9 QUESTIONS 1 AND 2: 0

## 2024-10-30 ENCOUNTER — LAB (OUTPATIENT)
Dept: LAB | Facility: LAB | Age: 74
End: 2024-10-30
Payer: MEDICARE

## 2024-10-30 ENCOUNTER — APPOINTMENT (OUTPATIENT)
Dept: PULMONOLOGY | Facility: CLINIC | Age: 74
End: 2024-10-30
Payer: MEDICARE

## 2024-10-30 VITALS
DIASTOLIC BLOOD PRESSURE: 69 MMHG | BODY MASS INDEX: 31.39 KG/M2 | TEMPERATURE: 98.2 F | HEART RATE: 63 BPM | OXYGEN SATURATION: 96 % | HEIGHT: 67 IN | WEIGHT: 200 LBS | SYSTOLIC BLOOD PRESSURE: 106 MMHG

## 2024-10-30 DIAGNOSIS — R06.09 OTHER FORM OF DYSPNEA: Primary | ICD-10-CM

## 2024-10-30 DIAGNOSIS — R06.81 APNEA: ICD-10-CM

## 2024-10-30 DIAGNOSIS — R06.83 SNORING: ICD-10-CM

## 2024-10-30 DIAGNOSIS — J84.10 PULMONARY FIBROSIS (MULTI): ICD-10-CM

## 2024-10-30 DIAGNOSIS — R05.3 CHRONIC COUGH: ICD-10-CM

## 2024-10-30 LAB
ALBUMIN SERPL BCP-MCNC: 3.9 G/DL (ref 3.4–5)
ALP SERPL-CCNC: 66 U/L (ref 33–136)
ALT SERPL W P-5'-P-CCNC: 24 U/L (ref 7–45)
ANION GAP SERPL CALC-SCNC: 16 MMOL/L (ref 10–20)
AST SERPL W P-5'-P-CCNC: 28 U/L (ref 9–39)
BASOPHILS # BLD AUTO: 0.07 X10*3/UL (ref 0–0.1)
BASOPHILS NFR BLD AUTO: 0.7 %
BILIRUB SERPL-MCNC: 0.7 MG/DL (ref 0–1.2)
BUN SERPL-MCNC: 14 MG/DL (ref 6–23)
CALCIUM SERPL-MCNC: 9.5 MG/DL (ref 8.6–10.3)
CHLORIDE SERPL-SCNC: 98 MMOL/L (ref 98–107)
CO2 SERPL-SCNC: 26 MMOL/L (ref 21–32)
CREAT SERPL-MCNC: 0.81 MG/DL (ref 0.5–1.05)
CRP SERPL-MCNC: 3.81 MG/DL
EGFRCR SERPLBLD CKD-EPI 2021: 76 ML/MIN/1.73M*2
EOSINOPHIL # BLD AUTO: 0.31 X10*3/UL (ref 0–0.4)
EOSINOPHIL NFR BLD AUTO: 3.1 %
ERYTHROCYTE [DISTWIDTH] IN BLOOD BY AUTOMATED COUNT: 13.2 % (ref 11.5–14.5)
ERYTHROCYTE [SEDIMENTATION RATE] IN BLOOD BY WESTERGREN METHOD: 45 MM/H (ref 0–30)
GLUCOSE SERPL-MCNC: 99 MG/DL (ref 74–99)
HCT VFR BLD AUTO: 42.8 % (ref 36–46)
HGB BLD-MCNC: 13.8 G/DL (ref 12–16)
IMM GRANULOCYTES # BLD AUTO: 0.03 X10*3/UL (ref 0–0.5)
IMM GRANULOCYTES NFR BLD AUTO: 0.3 % (ref 0–0.9)
LYMPHOCYTES # BLD AUTO: 2.1 X10*3/UL (ref 0.8–3)
LYMPHOCYTES NFR BLD AUTO: 21.1 %
MCH RBC QN AUTO: 30.1 PG (ref 26–34)
MCHC RBC AUTO-ENTMCNC: 32.2 G/DL (ref 32–36)
MCV RBC AUTO: 93 FL (ref 80–100)
MONOCYTES # BLD AUTO: 0.7 X10*3/UL (ref 0.05–0.8)
MONOCYTES NFR BLD AUTO: 7 %
NEUTROPHILS # BLD AUTO: 6.75 X10*3/UL (ref 1.6–5.5)
NEUTROPHILS NFR BLD AUTO: 67.8 %
NRBC BLD-RTO: 0 /100 WBCS (ref 0–0)
PLATELET # BLD AUTO: 356 X10*3/UL (ref 150–450)
POTASSIUM SERPL-SCNC: 3.5 MMOL/L (ref 3.5–5.3)
PROT SERPL-MCNC: 7 G/DL (ref 6.4–8.2)
RBC # BLD AUTO: 4.58 X10*6/UL (ref 4–5.2)
SODIUM SERPL-SCNC: 136 MMOL/L (ref 136–145)
WBC # BLD AUTO: 10 X10*3/UL (ref 4.4–11.3)

## 2024-10-30 PROCEDURE — 1126F AMNT PAIN NOTED NONE PRSNT: CPT | Performed by: NURSE PRACTITIONER

## 2024-10-30 PROCEDURE — 85025 COMPLETE CBC W/AUTO DIFF WBC: CPT

## 2024-10-30 PROCEDURE — 99204 OFFICE O/P NEW MOD 45 MIN: CPT | Performed by: NURSE PRACTITIONER

## 2024-10-30 PROCEDURE — 86225 DNA ANTIBODY NATIVE: CPT

## 2024-10-30 PROCEDURE — 1036F TOBACCO NON-USER: CPT | Performed by: NURSE PRACTITIONER

## 2024-10-30 PROCEDURE — 86140 C-REACTIVE PROTEIN: CPT

## 2024-10-30 PROCEDURE — 3008F BODY MASS INDEX DOCD: CPT | Performed by: NURSE PRACTITIONER

## 2024-10-30 PROCEDURE — 85652 RBC SED RATE AUTOMATED: CPT

## 2024-10-30 PROCEDURE — 86200 CCP ANTIBODY: CPT

## 2024-10-30 PROCEDURE — 86038 ANTINUCLEAR ANTIBODIES: CPT

## 2024-10-30 PROCEDURE — 86235 NUCLEAR ANTIGEN ANTIBODY: CPT

## 2024-10-30 PROCEDURE — 3074F SYST BP LT 130 MM HG: CPT | Performed by: NURSE PRACTITIONER

## 2024-10-30 PROCEDURE — 80053 COMPREHEN METABOLIC PANEL: CPT

## 2024-10-30 PROCEDURE — 1159F MED LIST DOCD IN RCRD: CPT | Performed by: NURSE PRACTITIONER

## 2024-10-30 PROCEDURE — 3078F DIAST BP <80 MM HG: CPT | Performed by: NURSE PRACTITIONER

## 2024-10-30 PROCEDURE — 86431 RHEUMATOID FACTOR QUANT: CPT

## 2024-10-30 RX ORDER — MOMETASONE FUROATE AND FORMOTEROL FUMARATE DIHYDRATE 100; 5 UG/1; UG/1
2 AEROSOL RESPIRATORY (INHALATION)
Qty: 13 G | Refills: 3 | Status: SHIPPED | OUTPATIENT
Start: 2024-10-30

## 2024-10-30 ASSESSMENT — ENCOUNTER SYMPTOMS
DEPRESSION: 0
COUGH: 1
ACTIVITY CHANGE: 1
OCCASIONAL FEELINGS OF UNSTEADINESS: 0
RHINORRHEA: 1
FATIGUE: 1
LOSS OF SENSATION IN FEET: 0
SHORTNESS OF BREATH: 1

## 2024-10-30 ASSESSMENT — PAIN SCALES - GENERAL: PAINLEVEL_OUTOF10: 0-NO PAIN

## 2024-10-30 ASSESSMENT — PATIENT HEALTH QUESTIONNAIRE - PHQ9
SUM OF ALL RESPONSES TO PHQ9 QUESTIONS 1 AND 2: 0
1. LITTLE INTEREST OR PLEASURE IN DOING THINGS: NOT AT ALL
2. FEELING DOWN, DEPRESSED OR HOPELESS: NOT AT ALL

## 2024-10-31 LAB
ANA PATTERN: ABNORMAL
ANA SER QL HEP2 SUBST: POSITIVE
ANA TITR SER IF: ABNORMAL {TITER}
CCP IGG SERPL-ACNC: <1 U/ML
CENTROMERE B AB SER-ACNC: <0.2 AI
CHROMATIN AB SERPL-ACNC: <0.2 AI
DSDNA AB SER-ACNC: <1 IU/ML
ENA JO1 AB SER QL IA: <0.2 AI
ENA RNP AB SER IA-ACNC: 0.4 AI
ENA SCL70 AB SER QL IA: <0.2 AI
ENA SM AB SER IA-ACNC: <0.2 AI
ENA SM+RNP AB SER QL IA: <0.2 AI
ENA SS-A AB SER IA-ACNC: <0.2 AI
ENA SS-B AB SER IA-ACNC: <0.2 AI
RHEUMATOID FACT SER NEPH-ACNC: <10 IU/ML (ref 0–15)
RIBOSOMAL P AB SER-ACNC: <0.2 AI

## 2024-11-01 LAB
ACE SERPL-CCNC: 29 U/L (ref 16–85)
ALDOLASE SERPL-CCNC: 4.5 U/L (ref 1.2–7.6)

## 2024-11-02 LAB
ANCA AB PATTERN SER IF-IMP: NORMAL
ANCA IGG TITR SER IF: NORMAL {TITER}
MYELOPEROXIDASE AB SER-ACNC: 1 AU/ML (ref 0–19)
PROTEINASE3 AB SER-ACNC: 2 AU/ML (ref 0–19)

## 2024-11-04 ENCOUNTER — HOSPITAL ENCOUNTER (OUTPATIENT)
Dept: CARDIOLOGY | Facility: CLINIC | Age: 74
Discharge: HOME | End: 2024-11-04
Payer: MEDICARE

## 2024-11-04 DIAGNOSIS — I49.5 SICK SINUS SYNDROME (MULTI): ICD-10-CM

## 2024-11-04 DIAGNOSIS — Z95.0 CARDIAC PACEMAKER IN SITU: ICD-10-CM

## 2024-11-05 LAB
A FUMIGATUS1 AB SER QL ID: NORMAL
A FUMIGATUS6 AB SER QL ID: NORMAL
A PULLULANS AB SER QL ID: NORMAL
PIGEON SERUM AB QL ID: NORMAL
S RECTIVIRGULA AB SER QL ID: NORMAL

## 2024-11-11 LAB
ANA SER QL: NEGATIVE
ANNOTATION COMMENT IMP: ABNORMAL
EJ AB SER QL: NEGATIVE
ENA JO1 IGG SER-ACNC: 2 AU/ML (ref 0–40)
ENA SS-A 60KD AB SER-ACNC: 0 AU/ML (ref 0–40)
ENA SS-A IGG SER QL: 3 AU/ML (ref 0–40)
FIBRILLARIN AB SER QL: NEGATIVE
KU AB SER QL: NEGATIVE
MDA5 AB SER QL LINE BLOT: NEGATIVE
MI2 AB SER QL: NEGATIVE
MJ AB SER QL LINE BLOT: ABNORMAL
OJ AB SER QL: NEGATIVE
PL12 AB SER QL: NEGATIVE
PL7 AB SER QL: NEGATIVE
PM/SCL-100 AB SER QL LINE BLOT: NEGATIVE
SAE1 AB SER QL LINE BLOT: NEGATIVE
SRP AB SERPL QL: NEGATIVE
TIF1-GAMMA AB SER QL LINE BLOT: NEGATIVE
U1 SNRNP IGG SER-ACNC: 7 UNITS (ref 0–19)

## 2024-11-18 ENCOUNTER — HOSPITAL ENCOUNTER (OUTPATIENT)
Dept: CARDIOLOGY | Facility: CLINIC | Age: 74
Discharge: HOME | End: 2024-11-18
Payer: MEDICARE

## 2024-11-18 DIAGNOSIS — Z95.0 CARDIAC PACEMAKER IN SITU: ICD-10-CM

## 2024-11-18 DIAGNOSIS — I49.5 SICK SINUS SYNDROME (MULTI): ICD-10-CM

## 2024-11-18 PROCEDURE — 93294 REM INTERROG EVL PM/LDLS PM: CPT | Performed by: INTERNAL MEDICINE

## 2024-11-18 PROCEDURE — 93296 REM INTERROG EVL PM/IDS: CPT

## 2024-11-21 DIAGNOSIS — E78.2 MIXED HYPERLIPIDEMIA: ICD-10-CM

## 2024-11-21 DIAGNOSIS — I10 BENIGN ESSENTIAL HYPERTENSION: ICD-10-CM

## 2024-11-21 DIAGNOSIS — I10 HYPERTENSION, UNSPECIFIED TYPE: ICD-10-CM

## 2024-11-21 RX ORDER — ATENOLOL 25 MG/1
25 TABLET ORAL 2 TIMES DAILY
Qty: 180 TABLET | Refills: 3 | Status: SHIPPED | OUTPATIENT
Start: 2024-11-21 | End: 2025-11-21

## 2024-11-21 RX ORDER — PRAVASTATIN SODIUM 20 MG/1
20 TABLET ORAL NIGHTLY
Qty: 90 TABLET | Refills: 3 | Status: SHIPPED | OUTPATIENT
Start: 2024-11-21

## 2024-11-21 RX ORDER — HYDROCHLOROTHIAZIDE 25 MG/1
25 TABLET ORAL DAILY
Qty: 90 TABLET | Refills: 3 | Status: SHIPPED | OUTPATIENT
Start: 2024-11-21

## 2024-11-26 ENCOUNTER — HOSPITAL ENCOUNTER (OUTPATIENT)
Dept: CARDIOLOGY | Facility: CLINIC | Age: 74
Discharge: HOME | End: 2024-11-26
Payer: MEDICARE

## 2024-11-26 DIAGNOSIS — I49.5 SICK SINUS SYNDROME (MULTI): ICD-10-CM

## 2024-11-26 DIAGNOSIS — Z95.0 CARDIAC PACEMAKER IN SITU: ICD-10-CM

## 2024-11-27 ENCOUNTER — APPOINTMENT (OUTPATIENT)
Dept: PULMONOLOGY | Facility: CLINIC | Age: 74
End: 2024-11-27
Payer: MEDICARE

## 2024-12-19 ENCOUNTER — HOSPITAL ENCOUNTER (OUTPATIENT)
Dept: RESPIRATORY THERAPY | Facility: HOSPITAL | Age: 74
Discharge: HOME | End: 2024-12-19
Payer: MEDICARE

## 2024-12-19 DIAGNOSIS — J84.10 PULMONARY FIBROSIS (MULTI): ICD-10-CM

## 2024-12-19 DIAGNOSIS — R06.09 OTHER FORM OF DYSPNEA: ICD-10-CM

## 2024-12-19 DIAGNOSIS — R05.3 CHRONIC COUGH: ICD-10-CM

## 2024-12-19 DIAGNOSIS — R09.02 HYPOXIA: Primary | ICD-10-CM

## 2024-12-19 LAB
MGC ASCENT PFT - FEV1 - POST: 1.79
MGC ASCENT PFT - FEV1 - POST: 1.79
MGC ASCENT PFT - FEV1 - PRE: 1.73
MGC ASCENT PFT - FEV1 - PRE: 1.73
MGC ASCENT PFT - FEV1 - PREDICTED: 2.28
MGC ASCENT PFT - FEV1 - PREDICTED: 2.28
MGC ASCENT PFT - FVC - POST: 2.05
MGC ASCENT PFT - FVC - POST: 2.05
MGC ASCENT PFT - FVC - PRE: 1.91
MGC ASCENT PFT - FVC - PRE: 1.91
MGC ASCENT PFT - FVC - PREDICTED: 2.99
MGC ASCENT PFT - FVC - PREDICTED: 2.99

## 2024-12-19 PROCEDURE — 94726 PLETHYSMOGRAPHY LUNG VOLUMES: CPT

## 2024-12-19 PROCEDURE — 94618 PULMONARY STRESS TESTING: CPT

## 2024-12-21 ENCOUNTER — PROCEDURE VISIT (OUTPATIENT)
Dept: SLEEP MEDICINE | Facility: HOSPITAL | Age: 74
End: 2024-12-21
Payer: MEDICARE

## 2024-12-21 DIAGNOSIS — R06.83 SNORING: ICD-10-CM

## 2024-12-21 DIAGNOSIS — R06.81 APNEA: ICD-10-CM

## 2024-12-21 PROCEDURE — 95806 SLEEP STUDY UNATT&RESP EFFT: CPT | Performed by: PSYCHIATRY & NEUROLOGY

## 2024-12-26 ENCOUNTER — TELEPHONE (OUTPATIENT)
Dept: PRIMARY CARE | Facility: CLINIC | Age: 74
End: 2024-12-26
Payer: MEDICARE

## 2024-12-26 DIAGNOSIS — E03.9 HYPOTHYROIDISM, UNSPECIFIED TYPE: ICD-10-CM

## 2024-12-26 NOTE — TELEPHONE ENCOUNTER
RX Refill- Levothyroxine 0.125 MG      [id number] - [place], [place] - [address] AT [facility] OF [address]  [address]  [facility] [address]  Phone: [phone] Fax: [phone]

## 2024-12-27 RX ORDER — LEVOTHYROXINE SODIUM 125 UG/1
125 TABLET ORAL DAILY
Qty: 30 TABLET | Refills: 0 | Status: SHIPPED | OUTPATIENT
Start: 2024-12-27 | End: 2025-01-26

## 2024-12-30 ENCOUNTER — HOSPITAL ENCOUNTER (OUTPATIENT)
Dept: CARDIOLOGY | Facility: CLINIC | Age: 74
Discharge: HOME | End: 2024-12-30
Payer: MEDICARE

## 2024-12-30 DIAGNOSIS — I49.5 SICK SINUS SYNDROME (MULTI): ICD-10-CM

## 2024-12-30 DIAGNOSIS — Z95.0 CARDIAC PACEMAKER IN SITU: ICD-10-CM

## 2024-12-30 NOTE — PROGRESS NOTES
Pulmonary Consult    Request of Pulmonary Consult by Melissa Fox to evaluate Alisha Garcia for interstitial lung disease.   I have independently interviewed and examined the patient in the office and reviewed available records.    Physician HPI (1/7/2025):  A74 yo F with PMH of Breast Cancer in 2013status post radiation, bradycardia status post pacemaker, Pacemaker S/P bradycardia  She had COVID July 2021   She complains of exertional short of breath for the last year got worse since August,   She also reports dry cough.    At baseline,  has dyspnea on exertion, but none at rest. Currently sits for most of the day, works inside the house, but does not carry loads and do strenuous exercise.  She Has to stop for breath after walking about 100 meters or a few minutes (mMRC 3). Her activity changed. If she cooks and does dishes she is dyspneic.    She denies orthopnea, PND , denies wheezing, and  no sputum.   No hemoptysis. No fever or shivering chills.   Has a runny nose, and a tingling sensation in the back of his throat. Also complains of heartburn - tums. Denies chest pain or heartburn.   Recently treated with Zpack for URI, feels better   No chest pain  Never diagnosed with asthma or COPD  Never smoker    ROS  Positive for fatigue and activity change  Heart burn in the evening, related  to diet and lying flat  She snores at night and wakes up at night 3-4 at night, takes 1 nap    Social History  Retired worked office job   Pets 5 dogs , then 2 dogs  6 years ago  No exposure   Had horses , had cows, lived in a farm used to help with a hay    Allergies : no, no known drug allergy      PSH  Gallbladder removed  Twice Breast cancer latest 2013 stage 1 lumpectomy then oral medication     Immunization History:  Immunization History   Administered Date(s) Administered    Moderna SARS-CoV-2 Vaccination 04/17/2021, 05/15/2021       Family History:  No family history on file.    Social History:  Social History  "    Socioeconomic History    Marital status:    Tobacco Use    Smoking status: Never    Smokeless tobacco: Never   Substance and Sexual Activity    Alcohol use: Not Currently    Drug use: Never       Current Medications:  Current Outpatient Medications   Medication Instructions    atenolol (TENORMIN) 25 mg, oral, 2 times daily    Eliquis 5 mg, oral, 2 times daily    ergocalciferol, vitamin D2, (VITAMIN D2 ORAL) Take by mouth.    glucosamine-chondroitin 500-400 mg tablet 1 tablet, 3 times daily    hydroCHLOROthiazide (HYDRODIURIL) 25 mg, oral, Daily    levothyroxine (SYNTHROID, LEVOXYL) 125 mcg, oral, Daily    mometasone-formoterol (Dulera) 100-5 mcg/actuation inhaler 2 puffs, inhalation, 2 times daily RT, Rinse mouth with water after use to reduce aftertaste and incidence of candidiasis. Do not swallow.    pravastatin (PRAVACHOL) 20 mg, oral, Nightly    sertraline (ZOLOFT) 100 mg, oral, Daily        Drug Allergies/Intolerances:  Allergies   Allergen Reactions    Codeine GI Upset    Lisinopril Diarrhea    Adhesive Tape-Silicones Rash     Steri strips    Ezetimibe Rash        Review of Systems:      All other review of systems are negative and/or non-contributory.    Physical Examination:  /80   Pulse 60   Resp 18   Ht 1.702 m (5' 7\")   Wt 89 kg (196 lb 3.2 oz)   SpO2 97%   BMI 30.73 kg/m²      General: ambulated independently; no acute distress; well-nourished; work of breathing was not increased; normal vocal character  HEENT: normocephalic; anicteric sclerae; conjunctivae not injected; nasal mucosa was unremarkable; oropharynx was clear without evidence of thrush; dentition was good.  Neck: supple; no lymphadenopathy or thyromegaly.  Chest: bilateral inspiratory crepitations  Cardiac: regular rhythm; no gallop or murmur.  Abdomen: soft; non-tender; non-distended; no hepatosplenomegaly.  Extremities: no leg edema; no digital clubbing; 2+ pulses  Psychiatric: did not appear depressed or " anxious.    Pulmonary Function Test Results     12/19/24  Study Result    Narrative & Impression   The patient ambulated 262 m during a 6-minute walk test (6-MWT), which was 67% of the predicted distance (391 m). Testing was performed with the patient breathing supplemental Oxygen at 3 LPM - see note above.       Chest Radiograph     XR CHEST 1 VIEW 06/29/2021    Narrative  STUDY:  Chest Radiograph;  06/29/2021 4:57 PM.    INDICATION:  Pneumothorax.    COMPARISON:  06/30/2017 XR Chest 2 Views.    ACCESSION NUMBER(S):  [id number]    ORDERING CLINICIAN:  AISHA BLACKWELL CNP    TECHNIQUE:  Frontal chest was obtained at 16:38 hours.    FINDINGS:    CARDIOMEDIASTINAL SILHOUETTE:  Heart is enlarged with left-sided cardiac pacemaker.  Mild vascular  congestion.    LUNGS:  Low lung volumes with questionable abnormal opacity in the right  suprahilar region.  Underlying stable chronic changes.    ABDOMEN:  No remarkable upper abdominal findings.    BONES:  No acute osseous changes.    Impression  Low lung volumes with questionable abnormal opacity in the right  suprahilar region. Recommend further evaluation with CT.      Signed by Ag Wynn MD      Echocardiogram     Interpretation Summary        [facility], [address] [place] [id number] [phone] and                                  Fax [phone]     TRANSTHORACIC ECHOCARDIOGRAM REPORT        Patient Name:     SANDIE HIGGINBOTHAM RUDI Reading Physician:   [id number] Aime Contreras MD  Study Date:       6/23/2021       Referring Physician: JAMES RAMICONE  MRN/PID:          [id number]        PCP:                 [id number] Adri Washington MD  Accession/Order#: [id number]    Department Location: [facility]  YOB: 1950       Fellow:  Gender:           F               Nurse:  Admit Date:                       Sonographer:         [facility]  Admission Status: Outpatient      Additional Staff:  Height:           170.18 cm       CC  Report to:  Weight:           90.72 kg        Study Type:          Echocardiogram  BSA:              2.02 m2  Blood Pressure: 126 /80 mmHg     Diagnosis/ICD: I48.0-Paroxysmal atrial fibrillation  Indication:  Procedure/CPT: Echo Complete w Full [id number]     Patient History:  Pertinent History: A-Fib, Sinus node dysfunction, Hyperlipidemia and HTN.     Study Detail: The following Echo studies were performed: 2D, M-Mode, Doppler and                color flow.        PHYSICIAN INTERPRETATION:  Left Ventricle: The left ventricular systolic function is normal, with an estimated ejection fraction of 55%. There are no regional wall motion abnormalities. The left ventricular cavity size is normal. Spectral Doppler shows an impaired relaxation pattern of left ventricular diastolic filling.  Left Atrium: The left atrium is normal in size.  Right Ventricle: The right ventricle is normal in size. There is normal right ventricular global systolic function.  Right Atrium: The right atrium is normal in size.  Aortic Valve: The aortic valve is trileaflet. There is evidence of mildly elevated transaortic gradients consistent with sclerosis of the aortic valve.  There is mild aortic valve regurgitation. The peak instantaneous gradient of the aortic valve is 12.1 mmHg. The mean gradient of the aortic valve is 7.5 mmHg.  Mitral Valve: The mitral valve is normal in structure. There is no evidence of mitral valve regurgitation.  Tricuspid Valve: The tricuspid valve is structurally normal. No evidence of tricuspid regurgitation.  Pulmonic Valve: The pulmonic valve is structurally normal. There is physiologic pulmonic valve regurgitation.  Pericardium: There is no pericardial effusion noted.  Aorta: The aortic root is normal.        CONCLUSIONS:   1. The left ventricular systolic function is normal with a 55% estimated ejection fraction.   2. Spectral Doppler shows an impaired relaxation pattern of left ventricular diastolic  filling.   3. Aortic valve sclerosis.   4. There is mild aortic valve regurgitation.     QUANTITATIVE DATA SUMMARY:  2D MEASUREMENTS:                           Normal Ranges:  LAs:           3.75 cm   (2.7-4.0cm)  IVSd:          1.06 cm   (0.6-1.1cm)  LVPWd:         1.05 cm   (0.6-1.1cm)  LVIDd:         4.78 cm   (3.9-5.9cm)  LVIDs:         3.41 cm  LV Mass Index: 90.0 g/m2  LV % FS        28.7 %     LA VOLUME:                               Normal Ranges:  LA Vol A4C:       66.4 ml    (22+/-6mL/m2)  LA Vol A2C:       40.6 ml  LA Vol BP:        53.1 ml  LA Vol Index A4C: 32.9ml/m2  LA Vol Index A2C: 20.1 ml/m2  LA Vol Index BP:  26.2 ml/m2  LA Vol A4C:       65.4 ml  LA Vol A2C:       39.7 ml     M-MODE MEASUREMENTS:                   Normal Ranges:  AoV Exc: 1.69 cm (1.5-2.5cm)     AORTA MEASUREMENTS:                   Normal Ranges:  AoV Exc: 1.69 cm (1.5-2.5cm)     LV SYSTOLIC FUNCTION BY 2D PLANIMETRY (MOD):                      Normal Ranges:  EF-A4C View: 64.1 % (>55%)  EF-A2C View: 50.8 %  EF-Biplane:  54.3 %     LV DIASTOLIC FUNCTION:                         Normal Ranges:  MV Peak E:    0.82 m/s (0.7-1.2 m/s)  MV Peak A:    0.97 m/s (0.42-0.7 m/s)  E/A Ratio:    0.84     (1.0-2.2)  MV lateral e' 0.06 m/s  MV medial e'  0.04 m/s     MITRAL VALVE:                       Normal Ranges:  MV Vmax:    1.13 m/s (<1.3m/s)  MV peak P.1 mmHg (<5mmHg)  MV mean P.7 mmHg (<2mmHg)  MV VTI:     27.58 cm (10-13cm)  MV DT:      218 msec (150-240msec)     AORTIC VALVE:                                     Normal Ranges:  AoV Vmax:                1.74 m/s  (<1.7m/s)  AoV Peak P.1 mmHg (<20mmHg)  AoV Mean P.5 mmHg  (1.7-11.5mmHg)  LVOT Max Jer:            1.11 m/s  (<1.1m/s)  AoV VTI:                 41.02 cm  (18-25cm)  LVOT VTI:                21.16 cm  LVOT Diameter:           1.95 cm   (1.8-2.4cm)  AoV Area, VTI:           1.54 cm2  (2.5-5.5cm2)  AoV Area,Vmax:           1.90 cm2   (2.5-4.5cm2)  AoV Dimensionless Index: 0.52     RIGHT VENTRICLE:  RV 1   3.5 cm  RV 2   2.1 cm  RV 3   6.7 cm  TAPSE: 20.0 mm  RV s'  0.00 m/s     TRICUSPID VALVE/RVSP:                              Normal Ranges:  Peak TR Velocity: 1.93 m/s  RV Syst Pressure: 17.9 mmHg (< 30mmHg)     PULMONIC VALVE:                       Normal Ranges:  PV Max Jer: 0.8 m/s  (0.6-0.9m/s)  PV Max P.8 mmHg     AORTA:  Asc Ao Diam 3.62 cm        [id number] Aime Contreras MD  Electronically signed on 2021 at 8:42:20 PM        \       Chest CT Scan     CT chest high resolution 2024    Narrative  Interpreted By:  Randy Givens and Beyersdorf Conner  STUDY:  CT CHEST HIGH RESOLUTION;  2024 10:24 am    INDICATION:  Signs/Symptoms:dry cough, dyspnea on exertion, history of radiation  for breast cancer, concern for possible fibrosis.    ,R05.8 Other specified cough,R06.09 Other forms of dyspnea    COMPARISON:  None.    ACCESSION NUMBER(S):  [id number]    ORDERING CLINICIAN:  ANEESH GREENE    TECHNIQUE:  Using helical multidetector technique, volumetric data acquisition of  the chest was obtained without intravenous administration of contrast  material under the high resolution chest CT protocol. Examination  includes contiguous slices through the chest in supine positioning  during inspiration, noncontiguous axial slices in supine positioning  during expiration and prone positioning during inspiration.    FINDINGS:  LUNGS AND AIRWAYS:  The trachea and central airways are patent. No endobronchial lesion  is seen. The bilateral lungs are clear without evidence of focal  consolidation, pleural effusion, or pneumothorax. Diffuse subpleural  reticulations, traction bronchiectasis/bronchiolectasis and  architectural distortion, and honeycombing throughout the bilateral  lungs. Findings are superimposed on atelectasis/scarring from prior  radiation treatments. Expiratory imaging demonstrates no evidence of  air-trapping. Prone  imaging reveals improvement in bibasilar  atelectasis, but persistence of bibasilar reticulations, consistent  with fibrotic changes.    There is an angulated solid perifissural nodule measuring 0.7 cm in  the right lung (series 204, image 129) may represent a intrapulmonary  lymph node or pulmonary nodule.    MEDIASTINUM AND ROHIT, LOWER NECK AND AXILLA:  The visualized thyroid gland is within normal limits.  Prominent para-aortic lymph node measuring 1.0 cm (series 201, image  88). No evidence of thoracic lymphadenopathy by CT criteria. There is  a smallsized hiatal hernia with mild concentric mid to distal  esophageal wall thickening; correlate with concern for reflux  esophagitis.    HEART AND VESSELS:  The thoracic aorta normal in course and caliber.There is mild  scattered calcified atherosclerosis present. Dilated main pulmonary  measuring 3.3 cm. No coronary artery calcifications are seen. Please  note, the study is not optimized for evaluation of coronary arteries.  The cardiac chambers are not enlarged.There is a left subclavian  approach dual chamber cardiac pacemaker/ICD seen in placewith leads  terminating within right atrium and apical right ventricle. There is  no pericardial effusion seen.    UPPER ABDOMEN:  Cholecystectomy. The visualized subdiaphragmatic structures  demonstrate no remarkable findings.    CHEST WALL AND OSSEOUS STRUCTURES:  Postsurgical changes of the right breast with multiple clips and  small fluid collection. Coarse calcification is present within the  left breast. No acute osseous pathology.There are no suspicious  osseous lesions.    Impression  1. Basal and subpleural predominant reticulations with traction  bronchiectasis/bronchiolectasis and honeycombing, consistent with  'Typical UIP' pattern of pulmonary fibrosis*. Findings are  superimposed on scarring secondary to radiation treatments.  2. Dilated main pulmonary artery measuring 3.3 cm, correlate with  concern for  pulmonary hypertension    *HRCT pattern of pulmonary fibrosis:    'Typical UIP' pattern : Basal predominant (occasionally diffuse), and  subpleural predominant reticulations with honeycombing with or  without traction bronchiectasis or bronchiolectasis; absence of  features to suggest an alternative diagnosis. Recommendations: In the  absence of identifiable cause, a UIP pattern is consistent with IPF  diagnosis. Consult a pulmonologist for further management.    (Cy G, Chip M, Christopher LARSON, et al. Diagnosis of Idiopathic  Pulmonary Fibrosis. An Official ATS/ERS/JRS/AUDREY Clinical Practice  Guideline. Am J Respir Crit Care Med. 2018;198(5):e44-e68.  doi:[email or web address]) PULMONARYFIBROSIS.ACR.FU.1    I personally reviewed the image(s)/study and resident interpretation.  I agree with the findings as stated by resident Uzair Carver.  Data analyzed and images interpreted at [facility], [place], [place].    MACRO:  None    Signed by: Randy Givens 9/25/2024 4:25 PM  Dictation workstation:   [id number]       Co-morbidities Problem List    Assessment and Plan / Recommendations:  Problem List Items Addressed This Visit       IPF (idiopathic pulmonary fibrosis) (Multi) - Primary    Relevant Orders    Referral to Pulmonary Rehabilitation    Oxygen therapy for home          Idiopathic pulmonary fibrosis IPF:  SOB and dry chronic cough   CT chest shows bilateral basal reticulation  Traction bronchiectasis and honey combing  Consistent with radiological UIP and diagnosis of IPF  PFT moderate obstructive defect with moderate decrease in DLCO    Recommend:  Referral to Pulmonary Rehabilitation  Oxygen therapy for home  Discussed with the patient options of antifibrotics treatment  Benefits and side effects were discussed in detail  Patient opted to wait till she reads more about the disease and effect of medication    Desaturation with exercise:  Oxygen therapy for  home    NARDA:  Newly diagnosed  Ordered CPAP    Alfredo Parker MD  12/31/2024

## 2024-12-31 ENCOUNTER — APPOINTMENT (OUTPATIENT)
Dept: PULMONOLOGY | Facility: CLINIC | Age: 74
End: 2024-12-31
Payer: MEDICARE

## 2024-12-31 VITALS
RESPIRATION RATE: 18 BRPM | HEIGHT: 67 IN | DIASTOLIC BLOOD PRESSURE: 80 MMHG | OXYGEN SATURATION: 97 % | BODY MASS INDEX: 30.79 KG/M2 | HEART RATE: 60 BPM | SYSTOLIC BLOOD PRESSURE: 116 MMHG | WEIGHT: 196.2 LBS

## 2024-12-31 DIAGNOSIS — J84.112 IPF (IDIOPATHIC PULMONARY FIBROSIS) (MULTI): Primary | ICD-10-CM

## 2024-12-31 DIAGNOSIS — G47.33 OSA (OBSTRUCTIVE SLEEP APNEA): ICD-10-CM

## 2024-12-31 PROCEDURE — 99215 OFFICE O/P EST HI 40 MIN: CPT | Performed by: INTERNAL MEDICINE

## 2024-12-31 PROCEDURE — 3074F SYST BP LT 130 MM HG: CPT | Performed by: INTERNAL MEDICINE

## 2024-12-31 PROCEDURE — 1159F MED LIST DOCD IN RCRD: CPT | Performed by: INTERNAL MEDICINE

## 2024-12-31 PROCEDURE — G2211 COMPLEX E/M VISIT ADD ON: HCPCS | Performed by: INTERNAL MEDICINE

## 2024-12-31 PROCEDURE — 3008F BODY MASS INDEX DOCD: CPT | Performed by: INTERNAL MEDICINE

## 2024-12-31 PROCEDURE — 3079F DIAST BP 80-89 MM HG: CPT | Performed by: INTERNAL MEDICINE

## 2024-12-31 ASSESSMENT — PATIENT HEALTH QUESTIONNAIRE - PHQ9
1. LITTLE INTEREST OR PLEASURE IN DOING THINGS: NOT AT ALL
SUM OF ALL RESPONSES TO PHQ9 QUESTIONS 1 AND 2: 0
2. FEELING DOWN, DEPRESSED OR HOPELESS: NOT AT ALL

## 2024-12-31 ASSESSMENT — COLUMBIA-SUICIDE SEVERITY RATING SCALE - C-SSRS
2. HAVE YOU ACTUALLY HAD ANY THOUGHTS OF KILLING YOURSELF?: NO
1. IN THE PAST MONTH, HAVE YOU WISHED YOU WERE DEAD OR WISHED YOU COULD GO TO SLEEP AND NOT WAKE UP?: NO
6. HAVE YOU EVER DONE ANYTHING, STARTED TO DO ANYTHING, OR PREPARED TO DO ANYTHING TO END YOUR LIFE?: NO

## 2024-12-31 ASSESSMENT — ENCOUNTER SYMPTOMS
OCCASIONAL FEELINGS OF UNSTEADINESS: 0
DEPRESSION: 0
LOSS OF SENSATION IN FEET: 0

## 2025-01-02 ENCOUNTER — HOSPITAL ENCOUNTER (OUTPATIENT)
Dept: CARDIOLOGY | Facility: CLINIC | Age: 75
Discharge: HOME | End: 2025-01-02
Payer: MEDICARE

## 2025-01-02 DIAGNOSIS — I49.5 SICK SINUS SYNDROME (MULTI): ICD-10-CM

## 2025-01-02 DIAGNOSIS — Z95.0 CARDIAC PACEMAKER IN SITU: ICD-10-CM

## 2025-01-06 ENCOUNTER — TELEPHONE (OUTPATIENT)
Facility: CLINIC | Age: 75
End: 2025-01-06
Payer: MEDICARE

## 2025-01-06 NOTE — TELEPHONE ENCOUNTER
what is the name of the 2 medications that you were recommending. patient wants to look them up. [phone]   16-Sep-2017 22:50

## 2025-01-07 PROBLEM — J84.112 IPF (IDIOPATHIC PULMONARY FIBROSIS) (MULTI): Status: ACTIVE | Noted: 2024-09-26

## 2025-01-08 ENCOUNTER — HOSPITAL ENCOUNTER (OUTPATIENT)
Dept: CARDIOLOGY | Facility: CLINIC | Age: 75
Discharge: HOME | End: 2025-01-08
Payer: MEDICARE

## 2025-01-08 DIAGNOSIS — I49.5 SICK SINUS SYNDROME (MULTI): ICD-10-CM

## 2025-01-08 DIAGNOSIS — Z95.0 CARDIAC PACEMAKER IN SITU: ICD-10-CM

## 2025-01-21 ENCOUNTER — LAB (OUTPATIENT)
Dept: LAB | Facility: LAB | Age: 75
End: 2025-01-21
Payer: MEDICARE

## 2025-01-21 DIAGNOSIS — E03.9 HYPOTHYROIDISM, UNSPECIFIED TYPE: ICD-10-CM

## 2025-01-21 PROCEDURE — 84443 ASSAY THYROID STIM HORMONE: CPT

## 2025-01-22 LAB — TSH SERPL-ACNC: 2.38 MIU/L (ref 0.44–3.98)

## 2025-01-23 ENCOUNTER — TELEPHONE (OUTPATIENT)
Dept: RHEUMATOLOGY | Facility: CLINIC | Age: 75
End: 2025-01-23
Payer: MEDICARE

## 2025-01-23 DIAGNOSIS — E03.9 HYPOTHYROIDISM, UNSPECIFIED TYPE: ICD-10-CM

## 2025-01-23 RX ORDER — LEVOTHYROXINE SODIUM 125 UG/1
125 TABLET ORAL DAILY
Qty: 90 TABLET | Refills: 3 | Status: SHIPPED | OUTPATIENT
Start: 2025-01-23 | End: 2026-01-23

## 2025-01-23 NOTE — TELEPHONE ENCOUNTER
Received a fax from [facility] in [place] for a refill of levothyroxine (Synthroid, Levoxyl) 125 mcg tablet .

## 2025-01-29 ENCOUNTER — HOSPITAL ENCOUNTER (OUTPATIENT)
Dept: CARDIOLOGY | Facility: CLINIC | Age: 75
Discharge: HOME | End: 2025-01-29
Payer: MEDICARE

## 2025-01-29 DIAGNOSIS — I49.5 SICK SINUS SYNDROME (MULTI): ICD-10-CM

## 2025-01-29 DIAGNOSIS — Z95.0 CARDIAC PACEMAKER IN SITU: ICD-10-CM

## 2025-01-30 ENCOUNTER — TELEPHONE (OUTPATIENT)
Dept: CARDIOLOGY | Facility: CLINIC | Age: 75
End: 2025-01-30
Payer: MEDICARE

## 2025-01-30 NOTE — TELEPHONE ENCOUNTER
Pt called today. She was expecting a call from Dr Ramicone. She wanted to speak with him regarding her pulmonary fibrosis diagnosis. She would like to know if there is a pulmonary doctor that Dr Ramicone would recommend? She also wants to discuss pulmonary meds that were recommended, if they'd be ok with cardiac medications.

## 2025-02-11 ENCOUNTER — APPOINTMENT (OUTPATIENT)
Facility: CLINIC | Age: 75
End: 2025-02-11
Payer: MEDICARE

## 2025-02-12 ENCOUNTER — HOSPITAL ENCOUNTER (OUTPATIENT)
Dept: CARDIOLOGY | Facility: CLINIC | Age: 75
Discharge: HOME | End: 2025-02-12
Payer: MEDICARE

## 2025-02-12 DIAGNOSIS — I49.5 SICK SINUS SYNDROME (MULTI): ICD-10-CM

## 2025-02-12 DIAGNOSIS — Z95.0 CARDIAC PACEMAKER IN SITU: ICD-10-CM

## 2025-02-21 ENCOUNTER — TELEPHONE (OUTPATIENT)
Dept: RHEUMATOLOGY | Facility: CLINIC | Age: 75
End: 2025-02-21
Payer: MEDICARE

## 2025-02-21 DIAGNOSIS — E03.9 HYPOTHYROIDISM, UNSPECIFIED TYPE: ICD-10-CM

## 2025-02-21 RX ORDER — LEVOTHYROXINE SODIUM 125 UG/1
125 TABLET ORAL DAILY
Qty: 90 TABLET | Refills: 3 | Status: SHIPPED | OUTPATIENT
Start: 2025-02-21 | End: 2026-02-21

## 2025-02-27 ENCOUNTER — CLINICAL SUPPORT (OUTPATIENT)
Dept: CARDIAC REHAB | Facility: HOSPITAL | Age: 75
End: 2025-02-27
Payer: MEDICARE

## 2025-02-27 VITALS
OXYGEN SATURATION: 96 % | BODY MASS INDEX: 30.29 KG/M2 | DIASTOLIC BLOOD PRESSURE: 64 MMHG | HEIGHT: 67 IN | WEIGHT: 193 LBS | SYSTOLIC BLOOD PRESSURE: 102 MMHG

## 2025-02-27 DIAGNOSIS — J84.112 IPF (IDIOPATHIC PULMONARY FIBROSIS) (MULTI): ICD-10-CM

## 2025-02-27 DIAGNOSIS — J84.112 FIBROSIS, IDIOPATHIC PULMONARY (MULTI): Primary | ICD-10-CM

## 2025-02-27 PROCEDURE — G0239 OTH RESP PROC, GROUP: HCPCS | Performed by: INTERNAL MEDICINE

## 2025-02-27 NOTE — PROGRESS NOTES
Pulmonary Rehabilitation Initial Treatment Plan    Name: Alisha Garcia  Medical Record Number: [id number]  YOB: 1950  Age: 75 y.o.    Today’s Date: 2/27/2025  Primary Care Physician: Adri Washington MD  Referring Physician: Alfredo Parker MD  Program Location: [facility] CARDIAC REHAB     General  Primary Diagnosis:   1. IPF (idiopathic pulmonary fibrosis) (Multi)  Referral to Pulmonary Rehabilitation         Onset/Date of Diagnosis: 10/01/24    Initial Assessment, not yet started program.    AACVPR Risk Stratification: N/A      Falls Risk: Low    Psychosocial Assessment    Sent PH-Q 9 to MD if score > 20: Survey not yet completed.    Stress Management    Pt reported/currently experiencing stress: Yes; Stress; Severity: mild  Patient uses stress management skills: Yes   History of: anxiety  Currently seeing a mental health provider: No  Social Support: Yes, Whom:Family   Pre CAT score: Survey given. Pending completion and return from patient.  Post CAT score: Survey to be given at discharge.   Learning Assessment:  Learning assessment/barriers: None  Preferred learning method: Auditory, Visual, Reading handout, and Writing handout  Barriers: None  Comments:    Stages of Change:Preparation    Psychosocial Plan    Goal Status: Initial Assessment; goals not yet started    Psychosocial Goals:   Maintain or lower PH-Q 9 score by discharge  Learn stress management techniques  Psychosocial Interventions/Education:   To be completed in [place] program.     Initial Assessment: 02/27/25    Oxygen Assessment    SpO2 at rest: 96 %  SpO2 with exertion: 93 %    Oxygen Use    Supplemental O2 prescribed: Yes,   Liters at rest: 3 L  Liters with exertion: 3 L    SpO2 at rest: 96 %  SpO2 with exertion: 93 %    Using O2 as prescribed: Yes  KRAIN ROMAN      Demonstrates appropriate knowledge of O2 use: Yes   Demonstrates appropriate knowledge of O2 safety: Yes     Home O2 System: Concentrator  Portable O2 System: Portable  Oxygen Concentrator    Hypoxia managed: Yes   Home Pulse Oximeter: Yes     Pre MMRC: 2  Post MMRC: To be done at discharge    Oxygen Plan  Goal Status: Initial Assessment; goals not yet started  Oxygen Goals:   Learn and use diaphragmatic breathing as needed, Learn and use pursed lip breathing as needed,   and Titrate supplemental O2 as needed to keep SpO2 at 88% or greater    Oxygen Education/Interventions:   Learning to use pursed lip breathing during exercise to help lessen dyspnea  Titrate supplemental O2 as needed to keep SpO2 at 88% or greater      Nutrition Assessment:    Hyperlipidemia: Yes     Lipids:   Lab Results   Component Value Date    CHOL 202 (H) 09/23/2024    HDL 48.8 09/23/2024    LDLF 120 (H) 09/07/2023    TRIG 132 09/23/2024       Current Dietary Guidelines:  Regular Diet   Barriers to dietary change: no    Diet Habit Survey: [place] Dietary Assessment    Pre: Initial survey given. Pending completion and return from patient.  Post: To be done at discharge.    Diabetes Assessment    Lab Results   Component Value Date    HGBA1C 6.0 (H) 09/23/2024       History of Diabetes: No    Weight Management     Ht:    67 in          Wt:   193 lbs         BMI: 30.2      Nutrition Plan    Goal Status: Initial Assessment; goals not yet started    Nutrition Goals:   Eat a healthy Low Animal Fat Diet    Nutrition Interventions/Education:   To be done in Cardiac Rehab.    Initial Assessment: 02/27/25    Exercise Assessment    No  Mode: NA  Frequency: NA  Duration: NA    6 Minute Walk Assessment     6 MWT distance:    243 Meters  FiO2 used during test: 3 Liters    Exercise Prescription      Exercise Prescription based on: 6 Minute Walk Test          Frequency:  2 days/week   Mode: Treadmill, NuStep/NuStep (T), Arm Ergometer and Airdyne, Recumbant Bike    Duration: 30-45 total aerobic minutes   Intensity: RPE 11-15  Target HR:  Rest +30  MET Level: 2.1  Patient wears supplemental O2: Yes;   O2 Flow Rate: 3l to 3  L/min  SpO2 Range: 88 to 99 %      Resistance Training: Yes   Home Exercise Prescription given: To be given prior to discharge from program.    1 Pre-Exercise /Rest             --                                     --                5:00  2 Education                            --                                    --              10 :00  3 Warm Up                             --                                    --               5 :00  4 Exercise                                --                                    --             20 :00  5 Resistance Training            --                                    --               15 :00  6 NuStep 4000                     44 Cadet, Level 2             2.3              10:00  7 NuStep T5                         24 Cadet, Level 2            2.3              10:00  8 Treadmill Walk                     1.4 mph                        2.1              10:00  9 Arm Ergometer                 18 Cadet, Level 1             2.1              10:00  10 Airdyne                              0.6 Load, 20-25 Rpm    2.0              10:00  11 Recumbant Bike                   Load 5, 40 RPM          2.1              10:00  11 Post-Exercise /Rest                                                                      5:00    Exercise Plan    Goal Status: Initial Assessment; goals not yet started    Exercise Goals:   150 minutes of moderate intensity aerobic exercise  Muscle toning 2-3 days/week.    Exercise Interventions/Education:   Increase exercise MET level by 5-10% each week  Increase total exercise duration to 30-45 minutes          Initial Assessment: 02/27/25    Other Core Components/Risk Factor Assessment:    Medication adherence  Current Medications:   Medication Documentation Review Audit       Reviewed by Lisa Zayas MA (Medical Assistant) on 12/31/24 at 1109      Medication Order Taking? Sig Documenting Provider Last Dose Status   atenolol (Tenormin) 25 mg tablet [id number] Yes  Take 1 tablet (25 mg) by mouth 2 times a day. Brooks Camilo PA-C  Active   Eliquis 5 mg tablet [id number] Yes Take 1 tablet (5 mg) by mouth 2 times a day. James C Ramicone,  Taking Active   ergocalciferol, vitamin D2, (VITAMIN D2 ORAL) [id number] Yes Take by mouth. Historical Provider, MD  Active   glucosamine-chondroitin 500-400 mg tablet [id number] Yes Take 1 tablet by mouth 3 times a day. Historical Provider, MD  Active   hydroCHLOROthiazide (HYDRODiuril) 25 mg tablet [id number] Yes Take 1 tablet (25 mg) by mouth once daily. Brooks Camilo PA-C  Active     Discontinued 12/27/24 1114   levothyroxine (Synthroid, Levoxyl) 125 mcg tablet [id number] Yes Take 1 tablet (125 mcg) by mouth once daily. Adri Washington MD  Active   mometasone-formoterol (Dulera) 100-5 mcg/actuation inhaler [id number] Yes Inhale 2 puffs 2 times a day. Rinse mouth with water after use to reduce aftertaste and incidence of candidiasis. Do not swallow. Melissa Fox, APRN-CNP  Active   pravastatin (Pravachol) 20 mg tablet [id number] Yes Take 1 tablet (20 mg) by mouth once daily at bedtime. Brooks Camilo PA-C  Active   sertraline (Zoloft) 100 mg tablet [id number] Yes take 1 tablet by mouth once daily Adri Washington MD Taking Active                 Allergies: Codeine, Lisinopril, Adhesive, Ezetimibe                Medication compliance: No   Uses pill box/organizer: Yes    Carries medication list: Yes    Uses Inhalers appropriately: N/A    Blood Pressure Management  History of Hypertension: Yes   Medication Changes: No   Resting BP:  102/64    Heart Failure Management  Hx of Heart Failure: No    Smoking/Tobacco Assessment  Social History     Tobacco Use   Smoking Status Never   Smokeless Tobacco Never       Goal Status: Initial Assessment; goals not yet started    Other Core Component Goals:  Improve knowledge test score    Other Core Component Interventions/Education:   BP readings taken pre and post exercise at each session  To be  done in Pulmonary Rehab.    Individual Patient Goals:   To establish home exercise routine 3-5 days a week, 30-60 min day by completion of the program  To increase strength and endurance by completion of the program     Goal Status: Initial Assessment; goals not yet started      Rehab Staff Signature: Garth Pena RRT    PHYSICIAN REVIEW AND RESPONSE:  Please check appropriate boxes and sign      __X_ The participant may enroll in the Pulmonary Rehabilitation Program with the above individualized treatment plan (ITP)  ____ Defer exercise guidelines and outcomes per department policy and protocol  ____ Make the following changes to the ITP/Exercise Prescription:

## 2025-02-27 NOTE — PROGRESS NOTES
Cardiac Rehabilitation {[facility] CR/MS/VR NOTE TYPE:07290}    Name: Alisha Garcia  Medical Record Number: [id number]  YOB: 1950  Age: 75 y.o.    Today’s Date: 2/27/2025  Primary Care Physician: Adri Washington MD  Referring Physician: No ref. provider found  Program Location: [facility] CARDIAC REHAB     [facility]  Primary Diagnosis: No diagnosis found.   Onset/Date of Diagnosis: ***    {[facility] CR/MS/VR Session Number:19728}    AACVPR Risk Stratification:       Falls Risk: {[facility] CR/MS/VR FALLS RISK:40161}  Psychosocial Assessment     No data recorded    Sent PH-Q 9 to MD if score > 20: {[facility] CR/MS/VR PHQ-9 SENT TO MD:09982}    Pt reported/currently experiencing stress: {[facility] CR/MS/VR Reported Stress:18054}  Patient uses stress management skills: {YES/NO:200010}  History of: {Hx Anxiety/Depression:90184}  Currently seeing a mental health provider: {Yes/No:29980}  Social Support: {Yes/No:27641}  Quality of Life Survey: {[facility] CR/MS/VR QOLS:12591}  Learning Assessment:  Learning assessment/barriers: {Assessment/barriers:85879}  Preferred learning method: {Preferred learning method:24746}  Barriers: {Barriers to Education:03669}  Comments:    Stages of Change:{Stages of change:32457}    Psychosocial Plan    Goal Status: {[facility] CR/MS/VR Goal Status:04578}    Psychosocial Goals: {UH CR/MS/VR PSYCHOSOCIAL GOALS:08621}    Psychosocial Interventions/Education: {[facility] CR/MS/VR To be done in Cardiac Rehab:54916}    {UH AMB CR/MS/VR Psychosocial Initial Reassess Discharge:06643}    Nutrition Assessment:    Hyperlipidemia: {YES/NO:200010}    Lipids:   Lab Results   Component Value Date    CHOL 202 (H) 09/23/2024    HDL 48.8 09/23/2024    LDLF 120 (H) 09/07/2023    TRIG 132 09/23/2024       Current Dietary Guidelines: {Dietary Guidelines:37002:x}  Barriers to dietary change: {response; yes (wildcard)/no:181655}    Diet Habit Survey: Picture Your Plate  Pre: {[facility] CR/MS/VR PYP Initial:54599}  Post: {[facility] CR/MS/VR PYP Post:40206}    Diabetes  Assessment    Lab Results   Component Value Date    HGBA1C 6.0 (H) 09/23/2024       History of Diabetes: { CR/NM/VR DM ASSESSMENT:70646}    Weight Management       No data recorded    Nutrition Plan    Goal Status: { CR/NM/VR Goal Status:10215}    Nutrition Goals: { CR/NM/VR Nutrition Goals:88965}    Nutrition Interventions/Education:   { CR/NM/VR To be done in Cardiac Rehab:90528}    { AMB CR/NM/VR Nutrition Initial Reassess Discharge:89040}    Exercise Assessment    { CR/NM/VR Current Home Exercise:66356}    Exercise Prescription     Exercise Prescription based on: { CR/NM/VR ExRx Evaluation:89423}   Frequency:  { CR/NM/VR Session Frequency:20473} days/week   Mode: {Mode:68218}   Duration: *** total aerobic minutes   Intensity: RPE ***  Target HR:  { CR/NM/VR Target HR:52630}  MET Level: ***  Patient wears supplemental O2: { CR/NM/VR Supplemental O2:07662}     Modality Workload METs Duration (minutes)   1 Pre-Exercise      2 { CR/NM/VR Exercise Modality:39531} ***  *** *** :00   3 { CR/NM/VR Exercise Modality:25626} ***  *** *** :00   4 { CR/NM/VR Exercise Modality:31953} ***  *** *** :00   5 { CR/NM/VR Exercise Modality:27961} ***  *** *** :00   6 Post-Exercise        Resistance Training: {YES/NO:612582}  Home Exercise Prescription given: { CR/NM/VR Home Ex Rx:76482}    Exercise Plan    Goal Status: { CR/NM/VR Goal Status:69798}    Exercise Goals: { CR/NM/VR Exercise Goals:43194}    Exercise Interventions/Education:   { CR/NM/VR To be done in Cardiac Rehab:07799}    { AMB CR/NM/VR Exercise Initial Reassess Discharge:88776}    Other Core Components/Risk Factor Assessment:    Medication adherence  Current Medications:   Medication Documentation Review Audit       Reviewed by Lisa Zayas MA (Medical Assistant) on 12/31/24 at 1109      Medication Order Taking? Sig Documenting Provider Last Dose Status   atenolol (Tenormin) 25 mg tablet [id number] Yes Take 1 tablet (25 mg) by  mouth 2 times a day. Brooks Camilo PA-C  Active   Eliquis 5 mg tablet [id number] Yes Take 1 tablet (5 mg) by mouth 2 times a day. James C Ramicone,  Taking Active   ergocalciferol, vitamin D2, (VITAMIN D2 ORAL) [id number] Yes Take by mouth. Historical Provider, MD  Active   glucosamine-chondroitin 500-400 mg tablet [id number] Yes Take 1 tablet by mouth 3 times a day. Historical Provider, MD  Active   hydroCHLOROthiazide (HYDRODiuril) 25 mg tablet [id number] Yes Take 1 tablet (25 mg) by mouth once daily. Brooks Camilo PA-C  Active     Discontinued 12/27/24 1114   levothyroxine (Synthroid, Levoxyl) 125 mcg tablet [id number] Yes Take 1 tablet (125 mcg) by mouth once daily. Adri Washington MD  Active   mometasone-formoterol (Dulera) 100-5 mcg/actuation inhaler [id number] Yes Inhale 2 puffs 2 times a day. Rinse mouth with water after use to reduce aftertaste and incidence of candidiasis. Do not swallow. Melissa Fox, APRN-CNP  Active   pravastatin (Pravachol) 20 mg tablet [id number] Yes Take 1 tablet (20 mg) by mouth once daily at bedtime. Brooks Camilo PA-C  Active   sertraline (Zoloft) 100 mg tablet [id number] Yes take 1 tablet by mouth once daily Adri Washington MD Taking Active                                 Medication compliance: {UH GEN YES NO WILDCARD WILDCARD:41657}   Uses pill box/organizer: {YES/NO:200010}   Carries medication list: {YES/NO:200010}    Blood Pressure Management  History of Hypertension: {YES/NO:200010}  Medication Changes: {YES/NO:200010}  Resting BP:  There were no vitals taken for this visit.     Heart Failure Management  Hx of Heart Failure: { CR/OR/VR HF Management:80387}    Smoking/Tobacco Assessment  Social History     Tobacco Use   Smoking Status Never   Smokeless Tobacco Never       Other Core Component Plan    Goal Status: { CR/OR/VR Goal Status:04469}    Other Core Component Goals: { CR/OR/VR Other Goals:04465}    Other Core Component Interventions/Education:    {UH CR/DE/VR Other Goals:64203}    {UH AMB CR/DE/VR Other Core Initial Reassess Discharge:60602}    Individual Patient Goals:    ***  ***    Goal Status: {UH CR/DE/VR Goal Status:69118}    Staff Comments:  ***    Rehab Staff Signature: Garth Pena, RRT

## 2025-03-05 DIAGNOSIS — R06.02 SHORTNESS OF BREATH: Primary | ICD-10-CM

## 2025-03-05 RX ORDER — ALBUTEROL SULFATE 90 UG/1
2 INHALANT RESPIRATORY (INHALATION) ONCE
OUTPATIENT
Start: 2025-03-05

## 2025-03-05 RX ORDER — ALBUTEROL SULFATE 0.83 MG/ML
3 SOLUTION RESPIRATORY (INHALATION) ONCE
OUTPATIENT
Start: 2025-03-05 | End: 2025-03-05

## 2025-03-06 ENCOUNTER — CLINICAL SUPPORT (OUTPATIENT)
Dept: CARDIAC REHAB | Facility: HOSPITAL | Age: 75
End: 2025-03-06
Payer: MEDICARE

## 2025-03-06 DIAGNOSIS — J84.112 FIBROSIS, IDIOPATHIC PULMONARY (MULTI): ICD-10-CM

## 2025-03-06 PROCEDURE — G0239 OTH RESP PROC, GROUP: HCPCS | Performed by: INTERNAL MEDICINE

## 2025-03-11 ENCOUNTER — CLINICAL SUPPORT (OUTPATIENT)
Dept: CARDIAC REHAB | Facility: HOSPITAL | Age: 75
End: 2025-03-11
Payer: MEDICARE

## 2025-03-11 DIAGNOSIS — J84.112 FIBROSIS, IDIOPATHIC PULMONARY (MULTI): ICD-10-CM

## 2025-03-11 PROCEDURE — G0239 OTH RESP PROC, GROUP: HCPCS | Performed by: INTERNAL MEDICINE

## 2025-03-13 ENCOUNTER — CLINICAL SUPPORT (OUTPATIENT)
Dept: CARDIAC REHAB | Facility: HOSPITAL | Age: 75
End: 2025-03-13
Payer: MEDICARE

## 2025-03-13 DIAGNOSIS — J84.112 FIBROSIS, IDIOPATHIC PULMONARY (MULTI): ICD-10-CM

## 2025-03-13 PROCEDURE — G0239 OTH RESP PROC, GROUP: HCPCS | Performed by: INTERNAL MEDICINE

## 2025-03-18 ENCOUNTER — CLINICAL SUPPORT (OUTPATIENT)
Dept: CARDIAC REHAB | Facility: HOSPITAL | Age: 75
End: 2025-03-18
Payer: MEDICARE

## 2025-03-18 DIAGNOSIS — J84.112 FIBROSIS, IDIOPATHIC PULMONARY (MULTI): ICD-10-CM

## 2025-03-18 PROCEDURE — G0239 OTH RESP PROC, GROUP: HCPCS | Performed by: INTERNAL MEDICINE

## 2025-03-19 ENCOUNTER — TELEPHONE (OUTPATIENT)
Dept: PRIMARY CARE | Facility: CLINIC | Age: 75
End: 2025-03-19
Payer: MEDICARE

## 2025-03-19 ENCOUNTER — HOSPITAL ENCOUNTER (OUTPATIENT)
Dept: CARDIOLOGY | Facility: CLINIC | Age: 75
Discharge: HOME | End: 2025-03-19
Payer: MEDICARE

## 2025-03-19 DIAGNOSIS — I49.5 SICK SINUS SYNDROME (MULTI): ICD-10-CM

## 2025-03-19 DIAGNOSIS — Z95.0 CARDIAC PACEMAKER IN SITU: ICD-10-CM

## 2025-03-19 PROCEDURE — 93296 REM INTERROG EVL PM/IDS: CPT

## 2025-03-20 ENCOUNTER — CLINICAL SUPPORT (OUTPATIENT)
Dept: CARDIAC REHAB | Facility: HOSPITAL | Age: 75
End: 2025-03-20
Payer: MEDICARE

## 2025-03-20 DIAGNOSIS — J84.112 FIBROSIS, IDIOPATHIC PULMONARY (MULTI): ICD-10-CM

## 2025-03-20 PROCEDURE — G0239 OTH RESP PROC, GROUP: HCPCS | Performed by: INTERNAL MEDICINE

## 2025-03-24 ENCOUNTER — HOSPITAL ENCOUNTER (OUTPATIENT)
Dept: CARDIOLOGY | Facility: CLINIC | Age: 75
Discharge: HOME | End: 2025-03-24
Payer: MEDICARE

## 2025-03-24 DIAGNOSIS — I49.5 SICK SINUS SYNDROME (MULTI): ICD-10-CM

## 2025-03-24 DIAGNOSIS — Z95.0 CARDIAC PACEMAKER IN SITU: ICD-10-CM

## 2025-03-25 ENCOUNTER — CLINICAL SUPPORT (OUTPATIENT)
Dept: CARDIAC REHAB | Facility: HOSPITAL | Age: 75
End: 2025-03-25
Payer: MEDICARE

## 2025-03-25 DIAGNOSIS — J84.112 FIBROSIS, IDIOPATHIC PULMONARY (MULTI): ICD-10-CM

## 2025-03-25 PROCEDURE — G0239 OTH RESP PROC, GROUP: HCPCS | Performed by: INTERNAL MEDICINE

## 2025-03-27 ENCOUNTER — CLINICAL SUPPORT (OUTPATIENT)
Dept: CARDIAC REHAB | Facility: HOSPITAL | Age: 75
End: 2025-03-27
Payer: MEDICARE

## 2025-03-27 DIAGNOSIS — J84.112 FIBROSIS, IDIOPATHIC PULMONARY (MULTI): ICD-10-CM

## 2025-03-27 PROCEDURE — G0239 OTH RESP PROC, GROUP: HCPCS | Performed by: INTERNAL MEDICINE

## 2025-03-27 NOTE — PROGRESS NOTES
Pulmonary Rehabilitation Individual Treatment Plan--30 Day 3/30-25    Name: Alisha Garcia  Medical Record Number: [id number]  YOB: 1950  Age: 75 y.o.    Today’s Date: 3/27/2025  Primary Care Physician: Adri Washington MD  Referring Physician: Merced Bhakta MD  Program Location: [facility] CARDIAC REHAB     General  Primary Diagnosis:   1. Fibrosis, idiopathic pulmonary (Multi)  Follow Up In Pulmonary Rehabilitation          CR/AZ/VR Session # attended: 7    Pt has consistent attendance    Onset/Date of Diagnosis: 10/01/24    Initial Assessment, not yet started program.    AACVPR Risk Stratification: N/A      Falls Risk: Low    Psychosocial Assessment    Pre PHQ-9: 5  Post PHQ-9: To be done at discharge.    Sent PH-Q 9 to MD if score > 20: Survey not yet completed.    Stress Management    Pt reported/currently experiencing stress: Yes; Stress; Severity: mild  Patient uses stress management skills: Yes   History of: anxiety  Currently seeing a mental health provider: No  Social Support: Yes, Whom:Family   Pre CAT score: 15  Post CAT score: Survey to be given at discharge.   Learning Assessment:  Learning assessment/barriers: None  Preferred learning method: Auditory, Visual, Reading handout, and Writing handout  Barriers: None  Comments: Ready and willing to Learn     Stages of Change: Action     Psychosocial Plan    Goal Status: In Progress     Psychosocial Goals:   Maintain or lower PH-Q 9 score by discharge  Learn stress management techniques by completion of the program    Psychosocial Interventions/Education:   PHQ-9 Reviewed with patient  Attended Stress and anxiety 03/13/25, Attended depression 03/20/25       [facility] CR/AZ/VR PSYCHOSOCIAL- 30 DAY REASSESSMENT 03/30/25    Oxygen Assessment    SpO2 at rest: 96 %  SpO2 with exertion: 93 %    Oxygen Use    Supplemental O2 prescribed: Yes,   Liters at rest: 3 L  Liters with exertion: 3 L    SpO2 at rest: 96 %  SpO2 with exertion: 93 %    Using O2 as  prescribed: Yes  DME: ABEL      Demonstrates appropriate knowledge of O2 use: Yes   Demonstrates appropriate knowledge of O2 safety: Yes     Home O2 System: Concentrator  Portable O2 System: Portable Oxygen Concentrator    Hypoxia managed: Yes   Home Pulse Oximeter: Yes     Pre MMRC: 2  Post MMRC: To be done at discharge    Oxygen Plan  Goal Status: In progress    Oxygen Goals:   Learn and use diaphragmatic breathing as needed, Learn and use pursed lip breathing as needed,   and Titrate supplemental O2 as needed to keep SpO2 at 88% or greater    Oxygen Education/Interventions:   Learning to use pursed lip breathing during exercise to help lessen dyspnea  Titrate supplemental O2 as needed to keep SpO2 at 88% or greater  Attended Nettie medications 03/11/25, Medications 03/13/25     AMB CR/MS/VR OXYGEN- 30 DAY REASSESSMENT 03/30/25    Nutrition Assessment:    Hyperlipidemia: Yes     Lipids:   Lab Results   Component Value Date    CHOL 202 (H) 09/23/2024    HDL 48.8 09/23/2024    LDLF 120 (H) 09/07/2023    TRIG 132 09/23/2024       Current Dietary Guidelines:  Regular Diet   Barriers to dietary change: no    Diet Habit Survey: [place] Dietary Assessment    Pre: %62.5  Post: To be done at discharge.    Diabetes Assessment    Lab Results   Component Value Date    HGBA1C 6.0 (H) 09/23/2024       History of Diabetes: No    Weight Management    PRE:       Ht:    67 in          Wt:   193 lbs         BMI: 30.2  POST:     Ht:     in             Wt:      lbs             BMI:         Nutrition Plan    Goal Status: In progress    Nutrition Goals:   Eat a healthy Low Animal Fat Diet  Increase new Leaf score by 5%-10%    Nutrition Interventions/Education:   Attended heart healthy low sodium diet 03/06/25     AMB CR/MS/VR Nutrition- 30 DAY REASSESSMENT 03/30/25  New leaf score reviewed. Healthy eating tip sheet provided    Exercise Assessment    No  Mode: NA  Frequency: NA  Duration: NA    6 Minute Walk Assessment     6 MWT  distance:    243 Meters  FiO2 used during test: 3 Liters    Exercise Prescription      Exercise Prescription based on: 6 Minute Walk Test And health History          Frequency:  2 days/week   Mode: Treadmill, NuStep/NuStep (T), Arm Ergometer and Airdyne, Recumbant Bike    Duration: 30-45 total aerobic minutes   Intensity: RPE 11-15  Target HR:  Rest +30  MET Level: 2.1  Patient wears supplemental O2: Yes;   O2 Flow Rate: 3l to 3 L/min  SpO2 Range: 88 to 99 %      Resistance Training: Yes   Home Exercise Prescription given: To be given prior to discharge from program.    1 Pre-Exercise /Rest             --                                     --                5:00  2 Education                            --                                    --              10 :00  3 Warm Up                             --                                    --               5 :00  4 Exercise                                --                                    --             20 :00  5 Resistance Training            --                                    --               15 :00  6 NuStep 4000                     54 Cadet, Level 3             2.5              12:00  7 NuStep T5                         34 Cadet, Level 3            2.5              12:00  8 Treadmill Walk                     1.4 mph                        2.1              12:00  9 Arm Ergometer                 18 Cadet, Level 1             2.1              12:00  10 Airdyne                              0.6 Load, 20-25 Rpm    2.0              12:00  11 Recumbant Bike                   Load 1, 45 RPM          2.1              12:00  11 Post-Exercise /Rest                                                                      5:00    Resistance training: Yes  Home exercise prescription given: To be given prior to discharge from program.    Min Sp02: 88%    Exercise Plan    Goal Status: In progress    Exercise Goals:   150 minutes of moderate intensity aerobic exercise  Muscle  toning 2-3 days/week.    Exercise Progression:  Increased exercise duration by 1 minute per modality for a total of  36 minutes.  Increased exercise intensity on NS based on patient's HR/BP/RPE    Exercise Interventions/Education:   Increase exercise MET level by 5-10% each week  Increase total exercise duration to 30-45 minutes  Attended Resistance Training 03/25/25     AMB CR/ME/VR Exercise - 30 DAY REASSESSMENT 03/30/25    Other Core Components/Risk Factor Assessment:    Medication adherence  Current Medications:   Medication Documentation Review Audit       Reviewed by Lisa Zayas MA (Medical Assistant) on 12/31/24 at 1109      Medication Order Taking? Sig Documenting Provider Last Dose Status   atenolol (Tenormin) 25 mg tablet [id number] Yes Take 1 tablet (25 mg) by mouth 2 times a day. Brooks Camilo PA-C  Active   Eliquis 5 mg tablet [id number] Yes Take 1 tablet (5 mg) by mouth 2 times a day. James C Ramicone, DO Taking Active   ergocalciferol, vitamin D2, (VITAMIN D2 ORAL) [id number] Yes Take by mouth. Historical Provider, MD  Active   glucosamine-chondroitin 500-400 mg tablet [id number] Yes Take 1 tablet by mouth 3 times a day. Historical Provider, MD  Active   hydroCHLOROthiazide (HYDRODiuril) 25 mg tablet [id number] Yes Take 1 tablet (25 mg) by mouth once daily. rBooks Camilo PA-C  Active     Discontinued 12/27/24 1114   levothyroxine (Synthroid, Levoxyl) 125 mcg tablet [id number] Yes Take 1 tablet (125 mcg) by mouth once daily. Adri Washington MD  Active   mometasone-formoterol (Dulera) 100-5 mcg/actuation inhaler [id number] Yes Inhale 2 puffs 2 times a day. Rinse mouth with water after use to reduce aftertaste and incidence of candidiasis. Do not swallow. Melissa Fox, APRN-CNP  Active   pravastatin (Pravachol) 20 mg tablet [id number] Yes Take 1 tablet (20 mg) by mouth once daily at bedtime. Brooks Camilo PA-C  Active   sertraline (Zoloft) 100 mg tablet [id number] Yes take 1 tablet by mouth  once daily Adri Washington MD Taking Active                 Allergies: Codeine, Lisinopril, Adhesive, Ezetimibe                Medication compliance: No   Uses pill box/organizer: Yes    Carries medication list: Yes    Uses Inhalers appropriately: N/A    Blood Pressure Management  History of Hypertension: Yes   Medication Changes: No   Resting BP:  102/64 (See session report for all VS taken)    Heart Failure Management  Hx of Heart Failure: No    Smoking/Tobacco Assessment  Social History     Tobacco Use   Smoking Status Never   Smokeless Tobacco Never       Goal Status: In Progress    Other Core Component Goals:   Maintain or Improve knowledge test score by completion of program  Manage bronchial hygiene  Resting BP < 130/80    Other Core Component Interventions/Education:   BP readings taken pre and post exercise at each session  To be done in Pulmonary Rehab.  Attended Weather 3/27/25    Individual Patient Goals:      1.To Establish home exercise routine 3-5 days/week, 30-60 minutes/day by completion of program.  Goal Status:  Initial Assessment; goals not yet started     2.To improve stamina and endurance with ADL'S having less shortness of breath by completion of the program.  Goal Status: Initial Assessment; goals not yet started    Pulmonary related ER visits: 0             Exacerbation Admits: 0    Staff Comments: Patient has done well in the program. Pt has shown consistent attendance and the ability to handle increased workload. Staff will continue to monitor and increase workloads as tolerated.     Rehab Staff Signature: Garth Pena RRT    PHYSICIAN REVIEW AND RESPONSE:  Please check appropriate boxes and sign      __X_ The participant may enroll in the Pulmonary Rehabilitation Program with the above individualized treatment plan (ITP)    ____ Make the following changes to the ITP/Exercise Prescription:

## 2025-03-31 ENCOUNTER — HOSPITAL ENCOUNTER (OUTPATIENT)
Dept: CARDIOLOGY | Facility: CLINIC | Age: 75
Discharge: HOME | End: 2025-03-31
Payer: MEDICARE

## 2025-03-31 DIAGNOSIS — I49.5 SICK SINUS SYNDROME (MULTI): ICD-10-CM

## 2025-03-31 DIAGNOSIS — Z95.0 CARDIAC PACEMAKER IN SITU: ICD-10-CM

## 2025-04-01 ENCOUNTER — CLINICAL SUPPORT (OUTPATIENT)
Dept: CARDIAC REHAB | Facility: HOSPITAL | Age: 75
End: 2025-04-01
Payer: MEDICARE

## 2025-04-01 DIAGNOSIS — J84.112 FIBROSIS, IDIOPATHIC PULMONARY (MULTI): ICD-10-CM

## 2025-04-01 PROCEDURE — 94626 PHY/QHP OP PULM RHB W/MNTR: CPT | Performed by: INTERNAL MEDICINE

## 2025-04-03 ENCOUNTER — CLINICAL SUPPORT (OUTPATIENT)
Dept: CARDIAC REHAB | Facility: HOSPITAL | Age: 75
End: 2025-04-03
Payer: MEDICARE

## 2025-04-03 DIAGNOSIS — J84.112 FIBROSIS, IDIOPATHIC PULMONARY (MULTI): ICD-10-CM

## 2025-04-03 PROCEDURE — G0239 OTH RESP PROC, GROUP: HCPCS | Performed by: INTERNAL MEDICINE

## 2025-04-08 ENCOUNTER — CLINICAL SUPPORT (OUTPATIENT)
Dept: CARDIAC REHAB | Facility: HOSPITAL | Age: 75
End: 2025-04-08
Payer: MEDICARE

## 2025-04-08 DIAGNOSIS — J84.112 FIBROSIS, IDIOPATHIC PULMONARY (MULTI): ICD-10-CM

## 2025-04-08 DIAGNOSIS — I48.91 ATRIAL FIBRILLATION, UNSPECIFIED TYPE (MULTI): ICD-10-CM

## 2025-04-08 PROCEDURE — G0239 OTH RESP PROC, GROUP: HCPCS

## 2025-04-08 PROCEDURE — G0239 OTH RESP PROC, GROUP: HCPCS | Performed by: INTERNAL MEDICINE

## 2025-04-08 PROCEDURE — 94626 PHY/QHP OP PULM RHB W/MNTR: CPT | Performed by: INTERNAL MEDICINE

## 2025-04-09 RX ORDER — APIXABAN 5 MG/1
5 TABLET, FILM COATED ORAL 2 TIMES DAILY
Qty: 180 TABLET | Refills: 3 | Status: SHIPPED | OUTPATIENT
Start: 2025-04-09 | End: 2026-04-09

## 2025-04-10 ENCOUNTER — CLINICAL SUPPORT (OUTPATIENT)
Dept: CARDIAC REHAB | Facility: HOSPITAL | Age: 75
End: 2025-04-10
Payer: MEDICARE

## 2025-04-10 DIAGNOSIS — J84.112 FIBROSIS, IDIOPATHIC PULMONARY (MULTI): ICD-10-CM

## 2025-04-10 PROCEDURE — G0239 OTH RESP PROC, GROUP: HCPCS | Performed by: INTERNAL MEDICINE

## 2025-04-14 ENCOUNTER — HOSPITAL ENCOUNTER (OUTPATIENT)
Dept: CARDIOLOGY | Facility: CLINIC | Age: 75
Discharge: HOME | End: 2025-04-14
Payer: MEDICARE

## 2025-04-14 DIAGNOSIS — Z95.0 CARDIAC PACEMAKER IN SITU: ICD-10-CM

## 2025-04-14 DIAGNOSIS — I49.5 SICK SINUS SYNDROME (MULTI): ICD-10-CM

## 2025-04-15 ENCOUNTER — CLINICAL SUPPORT (OUTPATIENT)
Dept: CARDIAC REHAB | Facility: HOSPITAL | Age: 75
End: 2025-04-15
Payer: MEDICARE

## 2025-04-15 DIAGNOSIS — J84.112 FIBROSIS, IDIOPATHIC PULMONARY (MULTI): ICD-10-CM

## 2025-04-15 PROCEDURE — G0239 OTH RESP PROC, GROUP: HCPCS | Performed by: INTERNAL MEDICINE

## 2025-04-17 ENCOUNTER — CLINICAL SUPPORT (OUTPATIENT)
Dept: CARDIAC REHAB | Facility: HOSPITAL | Age: 75
End: 2025-04-17
Payer: MEDICARE

## 2025-04-17 DIAGNOSIS — J84.112 FIBROSIS, IDIOPATHIC PULMONARY (MULTI): ICD-10-CM

## 2025-04-17 PROCEDURE — G0239 OTH RESP PROC, GROUP: HCPCS | Performed by: INTERNAL MEDICINE

## 2025-04-22 ENCOUNTER — CLINICAL SUPPORT (OUTPATIENT)
Dept: CARDIAC REHAB | Facility: HOSPITAL | Age: 75
End: 2025-04-22
Payer: MEDICARE

## 2025-04-22 DIAGNOSIS — J84.112 FIBROSIS, IDIOPATHIC PULMONARY (MULTI): ICD-10-CM

## 2025-04-22 PROCEDURE — G0239 OTH RESP PROC, GROUP: HCPCS | Performed by: INTERNAL MEDICINE

## 2025-04-24 ENCOUNTER — CLINICAL SUPPORT (OUTPATIENT)
Dept: CARDIAC REHAB | Facility: HOSPITAL | Age: 75
End: 2025-04-24
Payer: MEDICARE

## 2025-04-24 DIAGNOSIS — J84.112 FIBROSIS, IDIOPATHIC PULMONARY (MULTI): ICD-10-CM

## 2025-04-24 PROCEDURE — G0239 OTH RESP PROC, GROUP: HCPCS | Performed by: INTERNAL MEDICINE

## 2025-04-29 ENCOUNTER — CLINICAL SUPPORT (OUTPATIENT)
Dept: CARDIAC REHAB | Facility: HOSPITAL | Age: 75
End: 2025-04-29
Payer: MEDICARE

## 2025-04-29 DIAGNOSIS — J84.112 FIBROSIS, IDIOPATHIC PULMONARY (MULTI): ICD-10-CM

## 2025-04-29 PROCEDURE — G0239 OTH RESP PROC, GROUP: HCPCS | Performed by: INTERNAL MEDICINE

## 2025-04-30 ENCOUNTER — HOSPITAL ENCOUNTER (OUTPATIENT)
Dept: CARDIOLOGY | Facility: CLINIC | Age: 75
Discharge: HOME | End: 2025-04-30
Payer: MEDICARE

## 2025-04-30 DIAGNOSIS — Z95.0 CARDIAC PACEMAKER IN SITU: ICD-10-CM

## 2025-04-30 DIAGNOSIS — I49.5 SICK SINUS SYNDROME (MULTI): ICD-10-CM

## 2025-05-01 ENCOUNTER — CLINICAL SUPPORT (OUTPATIENT)
Dept: CARDIAC REHAB | Facility: HOSPITAL | Age: 75
End: 2025-05-01
Payer: MEDICARE

## 2025-05-01 DIAGNOSIS — J84.112 FIBROSIS, IDIOPATHIC PULMONARY (MULTI): ICD-10-CM

## 2025-05-01 PROCEDURE — G0239 OTH RESP PROC, GROUP: HCPCS | Performed by: INTERNAL MEDICINE

## 2025-05-08 ENCOUNTER — CLINICAL SUPPORT (OUTPATIENT)
Dept: CARDIAC REHAB | Facility: HOSPITAL | Age: 75
End: 2025-05-08
Payer: MEDICARE

## 2025-05-08 DIAGNOSIS — J84.112 FIBROSIS, IDIOPATHIC PULMONARY (MULTI): ICD-10-CM

## 2025-05-08 PROCEDURE — G0239 OTH RESP PROC, GROUP: HCPCS | Performed by: INTERNAL MEDICINE

## 2025-05-13 ENCOUNTER — CLINICAL SUPPORT (OUTPATIENT)
Dept: CARDIAC REHAB | Facility: HOSPITAL | Age: 75
End: 2025-05-13
Payer: MEDICARE

## 2025-05-13 ENCOUNTER — HOSPITAL ENCOUNTER (OUTPATIENT)
Dept: CARDIOLOGY | Facility: CLINIC | Age: 75
Discharge: HOME | End: 2025-05-13
Payer: MEDICARE

## 2025-05-13 DIAGNOSIS — J84.112 FIBROSIS, IDIOPATHIC PULMONARY (MULTI): ICD-10-CM

## 2025-05-13 DIAGNOSIS — I49.5 SICK SINUS SYNDROME (MULTI): ICD-10-CM

## 2025-05-13 DIAGNOSIS — Z95.0 CARDIAC PACEMAKER IN SITU: ICD-10-CM

## 2025-05-13 PROCEDURE — G0239 OTH RESP PROC, GROUP: HCPCS

## 2025-05-13 PROCEDURE — G0239 OTH RESP PROC, GROUP: HCPCS | Performed by: INTERNAL MEDICINE

## 2025-05-15 ENCOUNTER — CLINICAL SUPPORT (OUTPATIENT)
Dept: CARDIAC REHAB | Facility: HOSPITAL | Age: 75
End: 2025-05-15
Payer: MEDICARE

## 2025-05-15 DIAGNOSIS — J84.112 FIBROSIS, IDIOPATHIC PULMONARY (MULTI): ICD-10-CM

## 2025-05-15 PROCEDURE — G0239 OTH RESP PROC, GROUP: HCPCS | Performed by: INTERNAL MEDICINE

## 2025-05-20 ENCOUNTER — CLINICAL SUPPORT (OUTPATIENT)
Dept: CARDIAC REHAB | Facility: HOSPITAL | Age: 75
End: 2025-05-20
Payer: MEDICARE

## 2025-05-20 DIAGNOSIS — J84.112 FIBROSIS, IDIOPATHIC PULMONARY (MULTI): ICD-10-CM

## 2025-05-20 PROCEDURE — G0239 OTH RESP PROC, GROUP: HCPCS | Performed by: INTERNAL MEDICINE

## 2025-05-22 PROBLEM — E66.811 CLASS 1 OBESITY WITH BODY MASS INDEX (BMI) OF 30.0 TO 30.9 IN ADULT: Status: ACTIVE | Noted: 2024-07-29

## 2025-05-27 ENCOUNTER — CLINICAL SUPPORT (OUTPATIENT)
Dept: CARDIAC REHAB | Facility: HOSPITAL | Age: 75
End: 2025-05-27
Payer: MEDICARE

## 2025-05-27 ENCOUNTER — APPOINTMENT (OUTPATIENT)
Dept: CARDIOLOGY | Facility: CLINIC | Age: 75
End: 2025-05-27
Payer: MEDICARE

## 2025-05-27 DIAGNOSIS — J84.112 FIBROSIS, IDIOPATHIC PULMONARY (MULTI): ICD-10-CM

## 2025-05-27 PROCEDURE — G0239 OTH RESP PROC, GROUP: HCPCS | Performed by: INTERNAL MEDICINE

## 2025-06-02 ENCOUNTER — HOSPITAL ENCOUNTER (OUTPATIENT)
Dept: CARDIOLOGY | Facility: CLINIC | Age: 75
Discharge: HOME | End: 2025-06-02
Payer: MEDICARE

## 2025-06-02 DIAGNOSIS — I49.5 SICK SINUS SYNDROME (MULTI): ICD-10-CM

## 2025-06-02 DIAGNOSIS — Z95.0 CARDIAC PACEMAKER IN SITU: ICD-10-CM

## 2025-06-03 ENCOUNTER — CLINICAL SUPPORT (OUTPATIENT)
Dept: CARDIAC REHAB | Facility: HOSPITAL | Age: 75
End: 2025-06-03
Payer: MEDICARE

## 2025-06-03 DIAGNOSIS — J84.112 FIBROSIS, IDIOPATHIC PULMONARY (MULTI): ICD-10-CM

## 2025-06-03 PROCEDURE — G0239 OTH RESP PROC, GROUP: HCPCS | Performed by: INTERNAL MEDICINE

## 2025-06-10 ENCOUNTER — TELEPHONE (OUTPATIENT)
Dept: PRIMARY CARE | Facility: CLINIC | Age: 75
End: 2025-06-10
Payer: MEDICARE

## 2025-06-10 DIAGNOSIS — F32.A DEPRESSIVE DISORDER: ICD-10-CM

## 2025-06-10 RX ORDER — SERTRALINE HYDROCHLORIDE 100 MG/1
100 TABLET, FILM COATED ORAL DAILY
Qty: 90 TABLET | Refills: 0 | Status: SHIPPED | OUTPATIENT
Start: 2025-06-10

## 2025-06-10 NOTE — TELEPHONE ENCOUNTER
Dr Washington Pt is calling for refill.  Pt is out of pills.       sertraline (Zoloft) 100 mg tablet      [id number] - [place], [place] - [address] AT [facility] [address]  [address]  [facility] [id number]  Phone: [phone] Fax: [phone]

## 2025-06-12 ENCOUNTER — CLINICAL SUPPORT (OUTPATIENT)
Dept: CARDIAC REHAB | Facility: HOSPITAL | Age: 75
End: 2025-06-12
Payer: MEDICARE

## 2025-06-12 DIAGNOSIS — J84.112 FIBROSIS, IDIOPATHIC PULMONARY (MULTI): ICD-10-CM

## 2025-06-12 PROCEDURE — G0239 OTH RESP PROC, GROUP: HCPCS | Performed by: INTERNAL MEDICINE

## 2025-06-26 ENCOUNTER — APPOINTMENT (OUTPATIENT)
Dept: CARDIAC REHAB | Facility: HOSPITAL | Age: 75
End: 2025-06-26
Payer: MEDICARE

## 2025-07-01 ENCOUNTER — CLINICAL SUPPORT (OUTPATIENT)
Dept: CARDIAC REHAB | Facility: HOSPITAL | Age: 75
End: 2025-07-01
Payer: MEDICARE

## 2025-07-01 DIAGNOSIS — J84.112 FIBROSIS, IDIOPATHIC PULMONARY (MULTI): ICD-10-CM

## 2025-07-01 PROCEDURE — G0239 OTH RESP PROC, GROUP: HCPCS | Performed by: INTERNAL MEDICINE

## 2025-07-02 NOTE — PROGRESS NOTES
Pulmonary Rehabilitation Individual Treatment Plan- Discharge    Name: Alisha Garcia  Medical Record Number: [id number]  YOB: 1950  Age: 75 y.o.    Today’s Date: 7/2/2025  Primary Care Physician: Adri Washington MD  Referring Physician: Merced Bhakta MD  Program Location: [facility] CARDIAC REHAB     General  Primary Diagnosis: Pulmonary Fibrosis  1. Fibrosis, idiopathic pulmonary (Multi)  Follow Up In Pulmonary Rehabilitation         Onset/Date of Diagnosis: 10/01/24       CR/FL/VR Session # attended: 26, Pt has inconsistent attendance.    Falls Risk: Low  Patient instructed to attach safety clip to waist band on the first day of class and at each session before walking on the treadmill.    Needs assistance: NO  Assist device needed: No    Psychosocial Assessment  Pre PHQ-9 survey score: 5  Post PHQ-9 survey score: 2    Sent PH-Q 9 to MD if score > 20: N/A    Stress Management  Pt reported/currently experiencing stress: Yes; Stress; Severity: mild  Patient uses stress management skills: Yes   History of: anxiety  Currently seeing a mental health provider: No, Denies  Social Support: Yes, Whom:Family   Pre CAT score: 15  Post CAT score: 21  Learning Assessment:  Learning assessment/barriers: None, Denies  Preferred learning method: Auditory, Visual, Reading handout, and Writing handout  Barriers: None, Denies  Comments: Ready and willing to Learn     Stages of Change: Action     Psychosocial Plan    Goal Status: In Progress     Psychosocial Goals:   Maintain or lower PH-Q 9 score by discharge  Learn stress management techniques by completion of the program    Psychosocial Interventions/Education:   Pre program PHQ-9 Reviewed with patient  On first day, patient provided copy of outpatient counseling resource list handout.  Attended lecture for Stress and anxiety and depression education.    [facility] CR/FL/VR PSYCHOSOCIAL- Discharge      Oxygen Assessment    Initial 6 minute walk  SpO2 at rest: 96  %  SpO2 with exertion: 93 %    Oxygen Use  Supplemental O2 prescribed: Yes,   Liters at rest: 3 L  Liters with exertion: 3 L    SpO2 at rest: 96 %  SpO2 with exertion: 93 %    Using O2 as prescribed: Yes  DME: [facility]      Demonstrates appropriate knowledge of O2 use: Yes   Demonstrates appropriate knowledge of O2 safety: Yes     Home O2 System: Concentrator  Portable O2 System: Portable Oxygen Concentrator    Hypoxia managed: Yes   Home Pulse Oximeter: Yes     Pre MMRC: 2  Post MMRC: 1    Oxygen Plan  Goal Status: In progress    Oxygen Goals:   Learn and use diaphragmatic breathing as needed, Learn and use pursed lip breathing as needed,   and Titrate supplemental O2 as needed to keep SpO2 at 88% or greater    Oxygen Education/Interventions:   Learning to use pursed lip breathing during exercise to help lessen dyspnea  Titrate supplemental O2 as needed to keep SpO2 at 88% or greater      [facility] CR/NM/VR OXYGEN- Discharge  Attended lecture for breathing techniques education, controlling COPD, Oxygen Safety      Nutrition Assessment:    Hyperlipidemia: Yes     Lipids:   Lab Results   Component Value Date    CHOL 202 (H) 09/23/2024    HDL 48.8 09/23/2024    LDLF 120 (H) 09/07/2023    TRIG 132 09/23/2024       Current Dietary Guidelines: Regular Diet   Barriers to dietary change: no, Denies    Diet Habit Survey: [place] Dietary Assessment    Pre survey score: 62.5%  Post survey score: To be done at discharge.      Diabetes Assessment    Lab Results   Component Value Date    HGBA1C 6.0 (H) 09/23/2024       History of Diabetes: No    Weight Management    Ht:    67 in        PRE  Wt:   193 lbs        PRE BMI: 30.2    POST Wt:  195 lbs           POST  BMI:   30.53      Nutrition Plan    Goal Status: In progress    Nutrition Goals:   Eat a healthy Low Animal Fat Diet  Increase new Leaf score by 5%-10%    Nutrition Interventions/Education:   Pt attended education on Heart Healthy,  low sodium diets, Anti Inflammatory Eating    New leaf score reviewed. Healthy eating tip sheet provided. Areas with lowest scores reviewed.  Pt attended education on Nutrition Part 1,     [facility] CR/AK/VR Nutrition- Discharge      Exercise Assessment    Pre-program Exercise: None  Mode: SEDENTARY  Frequency: 0  days /week  Duration: 0 min / day    6 Minute Walk Assessment     PRE-6 MWT distance:    243 Meters  FiO2 used during test: 3 Liters    Exercise Prescription      Exercise Prescription based on: 6 Minute Walk Test And Health History      Frequency:  2 days/week   Mode: Treadmill, NuStep/NuStep (T), Arm Ergometer and Airdyne, Recumbant Bike    Duration: 30-45 total aerobic minutes   Intensity: RPE 11-15  Target HR:  Rest +30  MET Level: 2.1 max starting  Patient wears supplemental O2: Yes  O2 Flow Rate: 3 L/min via Nasal Cannula  SpO2 Range: LOW: 88%      HIGH: 99 %    Resistance Training: Yes       1 Pre-Exercise /Rest             --                                     --                5:00  2 Education                            --                                    --              10 :00  3 Warm Up                             --                                    --               5 :00  4 Exercise                                --                                    --             20 :00  5 Resistance Training            --                                    --                15:00  6 NuStep 4000                     64 Cadet, Level 4            2.7               28:00  7. NuStep T           44 Cadet, Level 4 2.7         28:00  8 Treadmill Walk                     1.5 mph                        2.0              15:00  9 Arm Ergometer                  24 Cadet, Level 2            2.4              15:00  10 Airdyne                              0.6 Load, 20-25 Rpm    2.0              15:00  11 Recumbant Bike                  Load 2, 50 RPM          2.1               15:00  11 Post-Exercise /Rest                                                                       5:00    Resistance training: Yes    Min Sp02: 88%      Exercise Plan    Goal Status: In progress    Exercise Goals:   150 minutes of moderate intensity aerobic exercise by the completion of the program  Muscle toning 2-3 days/week.    Exercise Progression:  Increased exercise duration by 1 minute per modality for a total of 45 minutes.  Increased exercise intensity on NS/AE/TM based on patient's HR/BP/RPE    Exercise Interventions/Education:   Pt attended education on Benefits of Exercise and the FITT Principal, Energy conservation.     AMB CR/ID/VR Exercise -Discharge      Other Core Components/Risk Factor Assessment:    Medication adherence  Current Medications:   Medication Documentation Review Audit       Reviewed by Lisa Zayas MA (Medical Assistant) on 12/31/24 at 1109      Medication Order Taking? Sig Documenting Provider Last Dose Status   atenolol (Tenormin) 25 mg tablet [id number] Yes Take 1 tablet (25 mg) by mouth 2 times a day. Brooks Camilo PA-C  Active   Eliquis 5 mg tablet [id number] Yes Take 1 tablet (5 mg) by mouth 2 times a day. James C Ramicone,  Taking Active   ergocalciferol, vitamin D2, (VITAMIN D2 ORAL) [id number] Yes Take by mouth. Historical Provider, MD  Active   glucosamine-chondroitin 500-400 mg tablet [id number] Yes Take 1 tablet by mouth 3 times a day. Historical Provider, MD  Active   hydroCHLOROthiazide (HYDRODiuril) 25 mg tablet [id number] Yes Take 1 tablet (25 mg) by mouth once daily. Brooks Camilo PA-C  Active     Discontinued 12/27/24 1114   levothyroxine (Synthroid, Levoxyl) 125 mcg tablet [id number] Yes Take 1 tablet (125 mcg) by mouth once daily. Adri Washington MD  Active   mometasone-formoterol (Dulera) 100-5 mcg/actuation inhaler [id number] Yes Inhale 2 puffs 2 times a day. Rinse mouth with water after use to reduce aftertaste and incidence of candidiasis. Do not swallow. Melissa Fox, APRN-CNP  Active   pravastatin (Pravachol) 20 mg tablet [id number]  Yes Take 1 tablet (20 mg) by mouth once daily at bedtime. Brooks Camilo PA-C  Active   sertraline (Zoloft) 100 mg tablet [id number] Yes take 1 tablet by mouth once daily Adri Washington MD Taking Active                 Allergies: Codeine, Lisinopril, Adhesive, Ezetimibe                Medication compliance: No   Uses pill box/organizer: Yes    Carries medication list: Yes    Uses Inhalers appropriately: N/A    Blood Pressure Management  History of Hypertension: Yes   Medication Changes: No   Resting BP:  (See session report for all VS taken at each exercise session.)    Heart Failure Management  Hx of Heart Failure: No    Smoking/Tobacco Assessment  Social History     Tobacco Use   Smoking Status Never   Smokeless Tobacco Never       Goal Status: In Progress    Other Core Component Goals:   Maintain or Improve knowledge test score by completion of program  Manage bronchial hygiene  Resting BP < 130/80    Other Core Component Interventions/Education:   BP readings taken pre and post exercise at each session  To be done in Pulmonary Rehab.    Other Core Component -Discharge  Attended education on pulmonary medications (Jay), Medications 03/13/25, Weather and Breathing, Smoking cessation, Controlling COPD, Breathing Techniques, Exacerbations     Individual Patient Goals:      1.To Establish home exercise routine 3-5 days/week, 30-60 minutes/day by completion of program.            Goal Status:  In progress     2.To improve stamina and endurance with ADL'S having less shortness of breath by completion of the program.            Goal Status:  Met      Pulmonary related ER visits: 0             Exacerbation Admits: 0    Educational Classes: Heart healthy low sodium (Dietitian) 3/6/25, JAY Medications 3/11/25, Medications 3/13/25, Stress and Anxiety, Depression 3/20/25, Resistance Training 3/25/25, Weather 3/27/25, Smoking Cessation 4/1/25, Controlling COPD 4/3/25, Breathing Techniques 4/8/25, Energy Conservation  4/10/25, Lung Diseases 4/15/25, Anti-Inflammatory Eating 4/17/25, Benefits of Exercise 4/22/25, Exacerbations 04/24/25, Nutrition Part 1 - 05/01/25, Oxygen Safety 05/08/25, F.I.T.T. Principal 05/13/25, Pulmonary Medications 05/15/25, Medications 05/20/25, Depression 05/27/25, Effects of Weather 06/03/25, Energy Conservation 06/12/25    Staff Comments:   Pt decided to complete pulmonary rehab after 26 sessions due to babysitting her grandchildren.  She met her goal of increasing her walking distance with less shortness of breath.  She is riding an exercise bike at home for 3 miles (approx 15 minutes), 3-5 days a week.  Encouraged her to increase her exercise time to 30-45 minutes and add walking to her regimen.  She learned to pace herself during pulmonary rehab and use a pulse oximeter to better manage her oxygen saturation.    PHYSICIAN REVIEW AND RESPONSE:  Please see electronic signature.

## 2025-07-08 ENCOUNTER — APPOINTMENT (OUTPATIENT)
Dept: CARDIAC REHAB | Facility: HOSPITAL | Age: 75
End: 2025-07-08
Payer: MEDICARE

## 2025-07-10 ENCOUNTER — APPOINTMENT (OUTPATIENT)
Dept: CARDIAC REHAB | Facility: HOSPITAL | Age: 75
End: 2025-07-10
Payer: MEDICARE

## 2025-07-15 ENCOUNTER — APPOINTMENT (OUTPATIENT)
Dept: CARDIAC REHAB | Facility: HOSPITAL | Age: 75
End: 2025-07-15
Payer: MEDICARE

## 2025-07-17 ENCOUNTER — APPOINTMENT (OUTPATIENT)
Dept: CARDIAC REHAB | Facility: HOSPITAL | Age: 75
End: 2025-07-17
Payer: MEDICARE

## 2025-07-22 ENCOUNTER — APPOINTMENT (OUTPATIENT)
Dept: CARDIAC REHAB | Facility: HOSPITAL | Age: 75
End: 2025-07-22
Payer: MEDICARE

## 2025-07-24 ENCOUNTER — APPOINTMENT (OUTPATIENT)
Dept: CARDIAC REHAB | Facility: HOSPITAL | Age: 75
End: 2025-07-24
Payer: MEDICARE

## 2025-07-29 ENCOUNTER — APPOINTMENT (OUTPATIENT)
Dept: CARDIAC REHAB | Facility: HOSPITAL | Age: 75
End: 2025-07-29
Payer: MEDICARE

## 2025-07-31 ENCOUNTER — APPOINTMENT (OUTPATIENT)
Dept: CARDIAC REHAB | Facility: HOSPITAL | Age: 75
End: 2025-07-31
Payer: MEDICARE

## 2025-08-05 ENCOUNTER — APPOINTMENT (OUTPATIENT)
Dept: CARDIAC REHAB | Facility: HOSPITAL | Age: 75
End: 2025-08-05
Payer: MEDICARE

## 2025-08-25 ENCOUNTER — TELEPHONE (OUTPATIENT)
Dept: PRIMARY CARE | Facility: CLINIC | Age: 75
End: 2025-08-25
Payer: MEDICARE

## 2025-08-25 DIAGNOSIS — E78.2 MIXED HYPERLIPIDEMIA: ICD-10-CM

## 2025-08-25 DIAGNOSIS — I10 BENIGN ESSENTIAL HYPERTENSION: Primary | ICD-10-CM

## 2025-08-25 DIAGNOSIS — E55.9 VITAMIN D DEFICIENCY: ICD-10-CM

## 2025-08-25 DIAGNOSIS — R73.01 IMPAIRED FASTING GLUCOSE: ICD-10-CM

## 2025-08-25 DIAGNOSIS — E03.9 ACQUIRED HYPOTHYROIDISM: ICD-10-CM

## 2025-08-26 ENCOUNTER — HOSPITAL ENCOUNTER (OUTPATIENT)
Dept: CARDIOLOGY | Facility: CLINIC | Age: 75
Discharge: HOME | End: 2025-08-26
Payer: MEDICARE

## 2025-08-26 DIAGNOSIS — I49.5 SICK SINUS SYNDROME (MULTI): ICD-10-CM

## 2025-08-26 DIAGNOSIS — Z95.0 CARDIAC PACEMAKER IN SITU: ICD-10-CM

## 2025-09-03 LAB
25(OH)D3+25(OH)D2 SERPL-MCNC: 34 NG/ML (ref 30–100)
ALBUMIN SERPL-MCNC: 4.2 G/DL (ref 3.6–5.1)
ALP SERPL-CCNC: 68 U/L (ref 37–153)
ALT SERPL-CCNC: 17 U/L (ref 6–29)
ANION GAP SERPL CALCULATED.4IONS-SCNC: 14 MMOL/L (CALC) (ref 7–17)
AST SERPL-CCNC: 23 U/L (ref 10–35)
BASOPHILS # BLD AUTO: 61 CELLS/UL (ref 0–200)
BASOPHILS NFR BLD AUTO: 0.8 %
BILIRUB SERPL-MCNC: 0.7 MG/DL (ref 0.2–1.2)
BUN SERPL-MCNC: 12 MG/DL (ref 7–25)
CALCIUM SERPL-MCNC: 9.9 MG/DL (ref 8.6–10.4)
CHLORIDE SERPL-SCNC: 98 MMOL/L (ref 98–110)
CHOLEST SERPL-MCNC: 189 MG/DL
CHOLEST/HDLC SERPL: 3.6 (CALC)
CO2 SERPL-SCNC: 27 MMOL/L (ref 20–32)
CREAT SERPL-MCNC: 0.76 MG/DL (ref 0.6–1)
EGFRCR SERPLBLD CKD-EPI 2021: 82 ML/MIN/1.73M2
EOSINOPHIL # BLD AUTO: 243 CELLS/UL (ref 15–500)
EOSINOPHIL NFR BLD AUTO: 3.2 %
ERYTHROCYTE [DISTWIDTH] IN BLOOD BY AUTOMATED COUNT: 14 % (ref 11–15)
EST. AVERAGE GLUCOSE BLD GHB EST-MCNC: 131 MG/DL
EST. AVERAGE GLUCOSE BLD GHB EST-SCNC: 7.3 MMOL/L
GLUCOSE SERPL-MCNC: 105 MG/DL (ref 65–99)
HBA1C MFR BLD: 6.2 %
HCT VFR BLD AUTO: 44.8 % (ref 35–45)
HDLC SERPL-MCNC: 53 MG/DL
HGB BLD-MCNC: 14.3 G/DL (ref 11.7–15.5)
LDLC SERPL CALC-MCNC: 110 MG/DL (CALC)
LYMPHOCYTES # BLD AUTO: 1710 CELLS/UL (ref 850–3900)
LYMPHOCYTES NFR BLD AUTO: 22.5 %
MCH RBC QN AUTO: 30.6 PG (ref 27–33)
MCHC RBC AUTO-ENTMCNC: 31.9 G/DL (ref 32–36)
MCV RBC AUTO: 95.7 FL (ref 80–100)
MONOCYTES # BLD AUTO: 486 CELLS/UL (ref 200–950)
MONOCYTES NFR BLD AUTO: 6.4 %
NEUTROPHILS # BLD AUTO: 5100 CELLS/UL (ref 1500–7800)
NEUTROPHILS NFR BLD AUTO: 67.1 %
NONHDLC SERPL-MCNC: 136 MG/DL (CALC)
PLATELET # BLD AUTO: 310 THOUSAND/UL (ref 140–400)
PMV BLD REES-ECKER: 11.3 FL (ref 7.5–12.5)
POTASSIUM SERPL-SCNC: 4.3 MMOL/L (ref 3.5–5.3)
PROT SERPL-MCNC: 7.6 G/DL (ref 6.1–8.1)
RBC # BLD AUTO: 4.68 MILLION/UL (ref 3.8–5.1)
SODIUM SERPL-SCNC: 139 MMOL/L (ref 135–146)
TRIGL SERPL-MCNC: 143 MG/DL
TSH SERPL-ACNC: 2.43 MIU/L (ref 0.4–4.5)
WBC # BLD AUTO: 7.6 THOUSAND/UL (ref 3.8–10.8)

## 2025-09-07 DIAGNOSIS — F32.A DEPRESSIVE DISORDER: ICD-10-CM

## 2025-09-07 RX ORDER — SERTRALINE HYDROCHLORIDE 100 MG/1
100 TABLET, FILM COATED ORAL DAILY
Qty: 90 TABLET | Refills: 3 | Status: SHIPPED | OUTPATIENT
Start: 2025-09-07

## 2025-09-10 ENCOUNTER — APPOINTMENT (OUTPATIENT)
Dept: RESPIRATORY THERAPY | Facility: HOSPITAL | Age: 75
End: 2025-09-10
Payer: MEDICARE

## 2025-09-11 ENCOUNTER — APPOINTMENT (OUTPATIENT)
Dept: PRIMARY CARE | Facility: CLINIC | Age: 75
End: 2025-09-11
Payer: MEDICARE

## 2025-10-15 ENCOUNTER — APPOINTMENT (OUTPATIENT)
Dept: RESPIRATORY THERAPY | Facility: HOSPITAL | Age: 75
End: 2025-10-15
Payer: MEDICARE